# Patient Record
Sex: MALE | Race: WHITE | HISPANIC OR LATINO
[De-identification: names, ages, dates, MRNs, and addresses within clinical notes are randomized per-mention and may not be internally consistent; named-entity substitution may affect disease eponyms.]

---

## 2020-01-13 PROBLEM — Z00.00 ENCOUNTER FOR PREVENTIVE HEALTH EXAMINATION: Status: ACTIVE | Noted: 2020-01-13

## 2020-02-05 ENCOUNTER — APPOINTMENT (OUTPATIENT)
Dept: HEPATOLOGY | Facility: CLINIC | Age: 65
End: 2020-02-05
Payer: MEDICAID

## 2020-02-05 VITALS
HEART RATE: 80 BPM | DIASTOLIC BLOOD PRESSURE: 90 MMHG | HEIGHT: 67 IN | WEIGHT: 220 LBS | TEMPERATURE: 98.1 F | BODY MASS INDEX: 34.53 KG/M2 | SYSTOLIC BLOOD PRESSURE: 187 MMHG

## 2020-02-05 DIAGNOSIS — Z87.891 PERSONAL HISTORY OF NICOTINE DEPENDENCE: ICD-10-CM

## 2020-02-05 DIAGNOSIS — Z86.39 PERSONAL HISTORY OF OTHER ENDOCRINE, NUTRITIONAL AND METABOLIC DISEASE: ICD-10-CM

## 2020-02-05 PROCEDURE — 99203 OFFICE O/P NEW LOW 30 MIN: CPT

## 2020-02-05 RX ORDER — LISINOPRIL 10 MG/1
10 TABLET ORAL
Refills: 0 | Status: ACTIVE | COMMUNITY

## 2020-02-05 RX ORDER — METFORMIN HYDROCHLORIDE 625 MG/1
TABLET ORAL
Refills: 0 | Status: ACTIVE | COMMUNITY

## 2020-02-05 RX ORDER — AMLODIPINE BESYLATE 10 MG/1
10 TABLET ORAL
Refills: 0 | Status: ACTIVE | COMMUNITY

## 2020-02-06 LAB
AFP-TM SERPL-MCNC: 8781 NG/ML
ALBUMIN SERPL ELPH-MCNC: 4.8 G/DL
ALP BLD-CCNC: 71 U/L
ALT SERPL-CCNC: 62 U/L
ANION GAP SERPL CALC-SCNC: 14 MMOL/L
AST SERPL-CCNC: 41 U/L
BASOPHILS # BLD AUTO: 0.02 K/UL
BASOPHILS NFR BLD AUTO: 0.3 %
BILIRUB SERPL-MCNC: 0.3 MG/DL
BUN SERPL-MCNC: 22 MG/DL
CALCIUM SERPL-MCNC: 10.4 MG/DL
CERULOPLASMIN SERPL-MCNC: 27 MG/DL
CHLORIDE SERPL-SCNC: 101 MMOL/L
CO2 SERPL-SCNC: 24 MMOL/L
CREAT SERPL-MCNC: 1.24 MG/DL
EOSINOPHIL # BLD AUTO: 0.18 K/UL
EOSINOPHIL NFR BLD AUTO: 2.9 %
ESTIMATED AVERAGE GLUCOSE: 166 MG/DL
FERRITIN SERPL-MCNC: 74 NG/ML
GLUCOSE SERPL-MCNC: 161 MG/DL
HBA1C MFR BLD HPLC: 7.4 %
HBV CORE IGG+IGM SER QL: NONREACTIVE
HBV SURFACE AB SER QL: NONREACTIVE
HBV SURFACE AG SER QL: NONREACTIVE
HCT VFR BLD CALC: 47.9 %
HCV AB SER QL: NONREACTIVE
HCV S/CO RATIO: 0.19 S/CO
HGB BLD-MCNC: 15.7 G/DL
IMM GRANULOCYTES NFR BLD AUTO: 0.3 %
INR PPP: 0.99 RATIO
IRON SATN MFR SERPL: 33 %
IRON SERPL-MCNC: 108 UG/DL
LYMPHOCYTES # BLD AUTO: 1.22 K/UL
LYMPHOCYTES NFR BLD AUTO: 19.3 %
MAN DIFF?: NORMAL
MCHC RBC-ENTMCNC: 28.9 PG
MCHC RBC-ENTMCNC: 32.8 GM/DL
MCV RBC AUTO: 88.2 FL
MONOCYTES # BLD AUTO: 0.72 K/UL
MONOCYTES NFR BLD AUTO: 11.4 %
NEUTROPHILS # BLD AUTO: 4.15 K/UL
NEUTROPHILS NFR BLD AUTO: 65.8 %
PLATELET # BLD AUTO: 183 K/UL
POTASSIUM SERPL-SCNC: 4.8 MMOL/L
PROT SERPL-MCNC: 8.3 G/DL
PT BLD: 11.4 SEC
RBC # BLD: 5.43 M/UL
RBC # FLD: 12.5 %
SODIUM SERPL-SCNC: 139 MMOL/L
TIBC SERPL-MCNC: 325 UG/DL
UIBC SERPL-MCNC: 218 UG/DL
WBC # FLD AUTO: 6.31 K/UL

## 2020-02-07 LAB
DEPRECATED KAPPA LC FREE/LAMBDA SER: 1.15 RATIO
IGA SER QL IEP: 554 MG/DL
IGG SER QL IEP: 1803 MG/DL
IGM SER QL IEP: 132 MG/DL
KAPPA LC CSF-MCNC: 2.33 MG/DL
KAPPA LC SERPL-MCNC: 2.67 MG/DL
LKM AB SER QL IF: <20.1 UNITS
MITOCHONDRIA AB SER IF-ACNC: NORMAL
TTG IGA SER IA-ACNC: <1.2 U/ML
TTG IGA SER-ACNC: NEGATIVE

## 2020-02-09 LAB
ANA SER IF-ACNC: NEGATIVE
SMOOTH MUSCLE AB SER QL IF: NORMAL

## 2020-02-13 LAB — SOLUBLE LIVER IGG SER IA-ACNC: < 20.1 UNITS

## 2020-03-13 ENCOUNTER — APPOINTMENT (OUTPATIENT)
Dept: MRI IMAGING | Facility: IMAGING CENTER | Age: 65
End: 2020-03-13

## 2020-04-01 ENCOUNTER — OUTPATIENT (OUTPATIENT)
Dept: OUTPATIENT SERVICES | Facility: HOSPITAL | Age: 65
LOS: 1 days | End: 2020-04-01
Payer: MEDICAID

## 2020-04-01 PROCEDURE — G9001: CPT

## 2020-04-22 ENCOUNTER — APPOINTMENT (OUTPATIENT)
Dept: HEPATOLOGY | Facility: CLINIC | Age: 65
End: 2020-04-22

## 2020-05-01 ENCOUNTER — OUTPATIENT (OUTPATIENT)
Dept: OUTPATIENT SERVICES | Facility: HOSPITAL | Age: 65
LOS: 1 days | End: 2020-05-01

## 2020-05-06 DIAGNOSIS — Z71.89 OTHER SPECIFIED COUNSELING: ICD-10-CM

## 2020-05-26 DIAGNOSIS — Z71.89 OTHER SPECIFIED COUNSELING: ICD-10-CM

## 2020-05-30 ENCOUNTER — TRANSCRIPTION ENCOUNTER (OUTPATIENT)
Age: 65
End: 2020-05-30

## 2020-06-10 ENCOUNTER — INPATIENT (INPATIENT)
Facility: HOSPITAL | Age: 65
LOS: 1 days | Discharge: HOME | End: 2020-06-12
Attending: INTERNAL MEDICINE | Admitting: INTERNAL MEDICINE
Payer: MEDICAID

## 2020-06-10 ENCOUNTER — APPOINTMENT (OUTPATIENT)
Dept: MRI IMAGING | Facility: CLINIC | Age: 65
End: 2020-06-10

## 2020-06-10 VITALS
HEART RATE: 116 BPM | HEIGHT: 67 IN | OXYGEN SATURATION: 95 % | TEMPERATURE: 98 F | DIASTOLIC BLOOD PRESSURE: 96 MMHG | SYSTOLIC BLOOD PRESSURE: 182 MMHG | WEIGHT: 220.02 LBS

## 2020-06-10 DIAGNOSIS — I10 ESSENTIAL (PRIMARY) HYPERTENSION: ICD-10-CM

## 2020-06-10 DIAGNOSIS — E11.9 TYPE 2 DIABETES MELLITUS WITHOUT COMPLICATIONS: ICD-10-CM

## 2020-06-10 DIAGNOSIS — C22.9 MALIGNANT NEOPLASM OF LIVER, NOT SPECIFIED AS PRIMARY OR SECONDARY: ICD-10-CM

## 2020-06-10 DIAGNOSIS — I81 PORTAL VEIN THROMBOSIS: ICD-10-CM

## 2020-06-10 DIAGNOSIS — K76.6 PORTAL HYPERTENSION: ICD-10-CM

## 2020-06-10 LAB
ALBUMIN SERPL ELPH-MCNC: 2.8 G/DL — LOW (ref 3.3–5)
ALP SERPL-CCNC: 236 U/L — HIGH (ref 40–120)
ALT FLD-CCNC: 116 U/L — HIGH (ref 12–78)
AMYLASE P1 CFR SERPL: 82 U/L — SIGNIFICANT CHANGE UP (ref 25–115)
ANION GAP SERPL CALC-SCNC: 12 MMOL/L — SIGNIFICANT CHANGE UP (ref 5–17)
APPEARANCE UR: CLEAR — SIGNIFICANT CHANGE UP
APTT BLD: 189.2 SEC — CRITICAL HIGH (ref 27.5–36.3)
APTT BLD: 63.4 SEC — HIGH (ref 27.5–36.3)
APTT BLD: 71.4 SEC — HIGH (ref 27.5–36.3)
AST SERPL-CCNC: 318 U/L — HIGH (ref 15–37)
BASOPHILS # BLD AUTO: 0.05 K/UL — SIGNIFICANT CHANGE UP (ref 0–0.2)
BASOPHILS NFR BLD AUTO: 0.7 % — SIGNIFICANT CHANGE UP (ref 0–2)
BILIRUB DIRECT SERPL-MCNC: 2.37 MG/DL — HIGH (ref 0.05–0.2)
BILIRUB INDIRECT FLD-MCNC: 0.9 MG/DL — SIGNIFICANT CHANGE UP (ref 0.2–1)
BILIRUB SERPL-MCNC: 3.3 MG/DL — HIGH (ref 0.2–1.2)
BILIRUB UR-MCNC: ABNORMAL
BUN SERPL-MCNC: 17 MG/DL — SIGNIFICANT CHANGE UP (ref 7–23)
CALCIUM SERPL-MCNC: 9.5 MG/DL — SIGNIFICANT CHANGE UP (ref 8.5–10.1)
CHLORIDE SERPL-SCNC: 98 MMOL/L — SIGNIFICANT CHANGE UP (ref 96–108)
CO2 SERPL-SCNC: 24 MMOL/L — SIGNIFICANT CHANGE UP (ref 22–31)
COLOR SPEC: YELLOW — SIGNIFICANT CHANGE UP
CREAT SERPL-MCNC: 1.09 MG/DL — SIGNIFICANT CHANGE UP (ref 0.5–1.3)
DIFF PNL FLD: ABNORMAL
EOSINOPHIL # BLD AUTO: 0.09 K/UL — SIGNIFICANT CHANGE UP (ref 0–0.5)
EOSINOPHIL NFR BLD AUTO: 1.2 % — SIGNIFICANT CHANGE UP (ref 0–6)
EPI CELLS # UR: SIGNIFICANT CHANGE UP
GLUCOSE BLDC GLUCOMTR-MCNC: 126 MG/DL — HIGH (ref 70–99)
GLUCOSE BLDC GLUCOMTR-MCNC: 135 MG/DL — HIGH (ref 70–99)
GLUCOSE BLDC GLUCOMTR-MCNC: 164 MG/DL — HIGH (ref 70–99)
GLUCOSE SERPL-MCNC: 156 MG/DL — HIGH (ref 70–99)
GLUCOSE UR QL: 1000 MG/DL
HAV IGM SER-ACNC: SIGNIFICANT CHANGE UP
HBV CORE IGM SER-ACNC: SIGNIFICANT CHANGE UP
HBV SURFACE AG SER-ACNC: SIGNIFICANT CHANGE UP
HCT VFR BLD CALC: 40.5 % — SIGNIFICANT CHANGE UP (ref 39–50)
HCT VFR BLD CALC: 41.4 % — SIGNIFICANT CHANGE UP (ref 39–50)
HCV AB S/CO SERPL IA: 0.16 S/CO — SIGNIFICANT CHANGE UP (ref 0–0.99)
HCV AB SERPL-IMP: SIGNIFICANT CHANGE UP
HGB BLD-MCNC: 12.6 G/DL — LOW (ref 13–17)
HGB BLD-MCNC: 13.1 G/DL — SIGNIFICANT CHANGE UP (ref 13–17)
IMM GRANULOCYTES NFR BLD AUTO: 0.4 % — SIGNIFICANT CHANGE UP (ref 0–1.5)
INR BLD: 1.17 RATIO — HIGH (ref 0.88–1.16)
KETONES UR-MCNC: ABNORMAL
LEUKOCYTE ESTERASE UR-ACNC: NEGATIVE — SIGNIFICANT CHANGE UP
LIDOCAIN IGE QN: 344 U/L — SIGNIFICANT CHANGE UP (ref 73–393)
LYMPHOCYTES # BLD AUTO: 0.82 K/UL — LOW (ref 1–3.3)
LYMPHOCYTES # BLD AUTO: 10.9 % — LOW (ref 13–44)
MCHC RBC-ENTMCNC: 22.3 PG — LOW (ref 27–34)
MCHC RBC-ENTMCNC: 22.3 PG — LOW (ref 27–34)
MCHC RBC-ENTMCNC: 31.1 GM/DL — LOW (ref 32–36)
MCHC RBC-ENTMCNC: 31.6 GM/DL — LOW (ref 32–36)
MCV RBC AUTO: 70.4 FL — LOW (ref 80–100)
MCV RBC AUTO: 71.8 FL — LOW (ref 80–100)
MONOCYTES # BLD AUTO: 0.74 K/UL — SIGNIFICANT CHANGE UP (ref 0–0.9)
MONOCYTES NFR BLD AUTO: 9.8 % — SIGNIFICANT CHANGE UP (ref 2–14)
NEUTROPHILS # BLD AUTO: 5.8 K/UL — SIGNIFICANT CHANGE UP (ref 1.8–7.4)
NEUTROPHILS NFR BLD AUTO: 77 % — SIGNIFICANT CHANGE UP (ref 43–77)
NITRITE UR-MCNC: NEGATIVE — SIGNIFICANT CHANGE UP
NRBC # BLD: 0 /100 WBCS — SIGNIFICANT CHANGE UP (ref 0–0)
NRBC # BLD: 0 /100 WBCS — SIGNIFICANT CHANGE UP (ref 0–0)
PH UR: 5 — SIGNIFICANT CHANGE UP (ref 5–8)
PLATELET # BLD AUTO: 324 K/UL — SIGNIFICANT CHANGE UP (ref 150–400)
PLATELET # BLD AUTO: 324 K/UL — SIGNIFICANT CHANGE UP (ref 150–400)
POTASSIUM SERPL-MCNC: 4.3 MMOL/L — SIGNIFICANT CHANGE UP (ref 3.5–5.3)
POTASSIUM SERPL-SCNC: 4.3 MMOL/L — SIGNIFICANT CHANGE UP (ref 3.5–5.3)
PROT SERPL-MCNC: 8.7 GM/DL — HIGH (ref 6–8.3)
PROT UR-MCNC: 30 MG/DL
PROTHROM AB SERPL-ACNC: 13 SEC — HIGH (ref 10–12.9)
RBC # BLD: 5.64 M/UL — SIGNIFICANT CHANGE UP (ref 4.2–5.8)
RBC # BLD: 5.88 M/UL — HIGH (ref 4.2–5.8)
RBC # FLD: 19.2 % — HIGH (ref 10.3–14.5)
RBC # FLD: 19.2 % — HIGH (ref 10.3–14.5)
RBC CASTS # UR COMP ASSIST: SIGNIFICANT CHANGE UP /HPF (ref 0–4)
SARS-COV-2 RNA SPEC QL NAA+PROBE: SIGNIFICANT CHANGE UP
SODIUM SERPL-SCNC: 134 MMOL/L — LOW (ref 135–145)
SP GR SPEC: 1.01 — SIGNIFICANT CHANGE UP (ref 1.01–1.02)
UROBILINOGEN FLD QL: 1 MG/DL
WBC # BLD: 6.97 K/UL — SIGNIFICANT CHANGE UP (ref 3.8–10.5)
WBC # BLD: 7.53 K/UL — SIGNIFICANT CHANGE UP (ref 3.8–10.5)
WBC # FLD AUTO: 6.97 K/UL — SIGNIFICANT CHANGE UP (ref 3.8–10.5)
WBC # FLD AUTO: 7.53 K/UL — SIGNIFICANT CHANGE UP (ref 3.8–10.5)
WBC UR QL: SIGNIFICANT CHANGE UP

## 2020-06-10 PROCEDURE — 74177 CT ABD & PELVIS W/CONTRAST: CPT | Mod: 26

## 2020-06-10 PROCEDURE — 71045 X-RAY EXAM CHEST 1 VIEW: CPT | Mod: 26

## 2020-06-10 PROCEDURE — 99222 1ST HOSP IP/OBS MODERATE 55: CPT

## 2020-06-10 PROCEDURE — 99285 EMERGENCY DEPT VISIT HI MDM: CPT

## 2020-06-10 PROCEDURE — 76700 US EXAM ABDOM COMPLETE: CPT | Mod: 26

## 2020-06-10 PROCEDURE — 93010 ELECTROCARDIOGRAM REPORT: CPT

## 2020-06-10 PROCEDURE — 12345: CPT | Mod: NC

## 2020-06-10 RX ORDER — DEXTROSE 50 % IN WATER 50 %
25 SYRINGE (ML) INTRAVENOUS ONCE
Refills: 0 | Status: DISCONTINUED | OUTPATIENT
Start: 2020-06-10 | End: 2020-06-12

## 2020-06-10 RX ORDER — AMLODIPINE BESYLATE 2.5 MG/1
10 TABLET ORAL DAILY
Refills: 0 | Status: DISCONTINUED | OUTPATIENT
Start: 2020-06-10 | End: 2020-06-10

## 2020-06-10 RX ORDER — LISINOPRIL 2.5 MG/1
10 TABLET ORAL DAILY
Refills: 0 | Status: DISCONTINUED | OUTPATIENT
Start: 2020-06-10 | End: 2020-06-10

## 2020-06-10 RX ORDER — AMLODIPINE BESYLATE 2.5 MG/1
10 TABLET ORAL DAILY
Refills: 0 | Status: DISCONTINUED | OUTPATIENT
Start: 2020-06-10 | End: 2020-06-11

## 2020-06-10 RX ORDER — HEPARIN SODIUM 5000 [USP'U]/ML
8000 INJECTION INTRAVENOUS; SUBCUTANEOUS ONCE
Refills: 0 | Status: COMPLETED | OUTPATIENT
Start: 2020-06-10 | End: 2020-06-10

## 2020-06-10 RX ORDER — SODIUM CHLORIDE 9 MG/ML
1000 INJECTION, SOLUTION INTRAVENOUS
Refills: 0 | Status: DISCONTINUED | OUTPATIENT
Start: 2020-06-10 | End: 2020-06-12

## 2020-06-10 RX ORDER — HEPARIN SODIUM 5000 [USP'U]/ML
INJECTION INTRAVENOUS; SUBCUTANEOUS
Qty: 25000 | Refills: 0 | Status: DISCONTINUED | OUTPATIENT
Start: 2020-06-10 | End: 2020-06-11

## 2020-06-10 RX ORDER — HEPARIN SODIUM 5000 [USP'U]/ML
8000 INJECTION INTRAVENOUS; SUBCUTANEOUS EVERY 6 HOURS
Refills: 0 | Status: DISCONTINUED | OUTPATIENT
Start: 2020-06-10 | End: 2020-06-11

## 2020-06-10 RX ORDER — LISINOPRIL 2.5 MG/1
10 TABLET ORAL DAILY
Refills: 0 | Status: DISCONTINUED | OUTPATIENT
Start: 2020-06-10 | End: 2020-06-12

## 2020-06-10 RX ORDER — GLUCAGON INJECTION, SOLUTION 0.5 MG/.1ML
1 INJECTION, SOLUTION SUBCUTANEOUS ONCE
Refills: 0 | Status: DISCONTINUED | OUTPATIENT
Start: 2020-06-10 | End: 2020-06-12

## 2020-06-10 RX ORDER — HEPARIN SODIUM 5000 [USP'U]/ML
4000 INJECTION INTRAVENOUS; SUBCUTANEOUS EVERY 6 HOURS
Refills: 0 | Status: DISCONTINUED | OUTPATIENT
Start: 2020-06-10 | End: 2020-06-11

## 2020-06-10 RX ORDER — SODIUM CHLORIDE 9 MG/ML
1000 INJECTION, SOLUTION INTRAVENOUS
Refills: 0 | Status: COMPLETED | OUTPATIENT
Start: 2020-06-10 | End: 2020-06-10

## 2020-06-10 RX ORDER — DEXTROSE 50 % IN WATER 50 %
12.5 SYRINGE (ML) INTRAVENOUS ONCE
Refills: 0 | Status: DISCONTINUED | OUTPATIENT
Start: 2020-06-10 | End: 2020-06-12

## 2020-06-10 RX ORDER — DEXTROSE 50 % IN WATER 50 %
15 SYRINGE (ML) INTRAVENOUS ONCE
Refills: 0 | Status: DISCONTINUED | OUTPATIENT
Start: 2020-06-10 | End: 2020-06-12

## 2020-06-10 RX ORDER — INSULIN LISPRO 100/ML
VIAL (ML) SUBCUTANEOUS
Refills: 0 | Status: DISCONTINUED | OUTPATIENT
Start: 2020-06-10 | End: 2020-06-12

## 2020-06-10 RX ADMIN — Medication 1: at 11:58

## 2020-06-10 RX ADMIN — HEPARIN SODIUM 5000 UNIT(S): 5000 INJECTION INTRAVENOUS; SUBCUTANEOUS at 07:02

## 2020-06-10 RX ADMIN — SODIUM CHLORIDE 70 MILLILITER(S): 9 INJECTION, SOLUTION INTRAVENOUS at 07:06

## 2020-06-10 RX ADMIN — AMLODIPINE BESYLATE 10 MILLIGRAM(S): 2.5 TABLET ORAL at 08:16

## 2020-06-10 RX ADMIN — HEPARIN SODIUM 1800 UNIT(S)/HR: 5000 INJECTION INTRAVENOUS; SUBCUTANEOUS at 15:30

## 2020-06-10 RX ADMIN — HEPARIN SODIUM 1800 UNIT(S)/HR: 5000 INJECTION INTRAVENOUS; SUBCUTANEOUS at 21:10

## 2020-06-10 RX ADMIN — LISINOPRIL 10 MILLIGRAM(S): 2.5 TABLET ORAL at 09:37

## 2020-06-10 RX ADMIN — HEPARIN SODIUM 1800 UNIT(S)/HR: 5000 INJECTION INTRAVENOUS; SUBCUTANEOUS at 07:03

## 2020-06-10 NOTE — H&P ADULT - NSHPPHYSICALEXAM_GEN_ALL_CORE
Physical exam:  General: patient in no acute distress, resting comfortably  Head:  Atraumatic, Normocephalic  Eyes: EOMI, PERRLA, clear sclera  Neck: Supple, thyroid nontender, non enlarged  Cardio: S1/S2 +ve, regular rate and rhythm, no M/G/R  Resp: clear to ausculation bilaterally, no rales or wheezes  GI: abdomen soft, nontender to soft and deep palpation in all quadrants, non distended, no guarding, BS +ve x 4  Ext: no significant pedal edema  Neuro: CN 2-12 intact, no significant motor or sensory deficits.  Skin: No rashes or lesions

## 2020-06-10 NOTE — CHART NOTE - NSCHARTNOTEFT_GEN_A_CORE
Patient seen and examined bedside.     Vital Signs Last 24 Hrs  T(C): 36.9 (10 Guru 2020 16:45), Max: 37.1 (10 Guru 2020 07:40)  T(F): 98.5 (10 Guru 2020 16:45), Max: 98.8 (10 Guru 2020 07:40)  HR: 90 (10 Guru 2020 16:45) (86 - 116)  BP: 169/82 (10 Guru 2020 16:45) (146/81 - 182/96)  BP(mean): --  RR: 18 (10 Guru 2020 16:45) (17 - 19)  SpO2: 96% (10 Guru 2020 16:45) (95% - 98%)    GENERAL: NAD well-developed  HEAD:  Atraumatic, Normocephalic  EYES: EOMI, PERRLA, conjunctiva and sclera clear  ENMT: No tonsillar erythema, exudates, or enlargement; Moist mucous membranes, Good dentition, No lesions  NECK: Supple, No JVD, Normal thyroid  NERVOUS SYSTEM:  Alert & Oriented X3, Good concentration; Motor Strength 5/5 B/L upper and lower extremities; DTRs 2+ intact and symmetric  CHEST/LUNG: Clear to percussion bilaterally; No rales, rhonchi, wheezing, or rubs  HEART: Regular rate and rhythm; No murmurs, rubs, or gallops  ABDOMEN: Soft, Nontender, Nondistended; Bowel sounds present  EXTREMITIES:  2+ Peripheral Pulses, No clubbing, cyanosis, or edema  LYMPH: No lymphadenopathy   SKIN: No rashes or lesions    labs and imaging :                        12.6   6.97  )-----------( 324      ( 10 Guru 2020 14:41 )             40.5   06-10    134<L>  |  98  |  17  ----------------------------<  156<H>  4.3   |  24  |  1.09    Ca    9.5      10 Guru 2020 03:12    TPro  8.7<H>  /  Alb  2.8<L>  /  TBili  3.3<H>  /  DBili  2.37<H>  /  AST  318<H>  /  ALT  116<H>  /  AlkPhos  236<H>  06-10    < from: CT Abdomen and Pelvis w/ IV Cont (06.10.20 @ 05:45) >      TECHNIQUE: CT examination of the abdomen and pelvis was performed following the intravenous administration of 90 mL Optiray 350. 35 mL Optiray 350 discarded.  CT dose lowering techniques were used, to include: automated exposure control, adjustment for patient size, and/or use of iterative reconstruction.    FINDINGS:    ABDOMEN/PELVIS:    Lower Chest: Clear    Liver: Severe hepatomegaly. Areas of nodular hypoenhancement. Nodularity ofthe hepatic capsule.    Gallbladder/Billary: Decompressed    Pancreas: Within normal limits    Spleen: Mildly enlarged    Adrenal Glands: Within normal limits    Kidneys: 2.9 cm complex cystic mass arising from the upper pole right kidney, with areasof peripheral calcification and central density measurement higher than simple fluid.    GI Tract: Diverticulosis coli. Normal appendix. No bowel obstruction.    Mesentery/Peritoneum: Mild perihepatic ascites. No free air.    Vasculature: Thrombosis of the right portal vein appears acute. Surgical clips in the adjacent rahul hepatis. Main portal vein is enlarged. Superior mesenteric vein appears patent. Atherosclerotic change of the abdominal aorta without aneurysm.    Lymph Nodes: No enlarged lymph node measuring greater than 10 mm in short axis.    Abdominal Wall: Intact    Bladder: Within normal limits    Reproductive: Prostate is normal in caliber.    Musculoskeletal: No acute osseous abnormality.      IMPRESSION:    1.  Acute thrombosis of the right portal vein. Critical results discussed with Dr. Bolden at 6 AM.   2.  Hepatic cirrhosis and stigmata portal venous hypertension.  3.  Indeterminate cystic mass arising from the upper pole right kidney, nonemergent dedicated sonographic evaluation is recommended.    < end of copied text >    < from: US Abdomen Complete (06.10.20 @ 04:44) >      FINDINGS:     LIVER: Mild hepatomegaly. Nonspecific hepatic heterogeneity in echogenicity and echotexture, limiting evaluation for focal lesions. No intrahepatic biliary ductal dilatation.   GALLBLADDER:Contracted. No gallstones, wall thickening, or pericholecystic fluid. No sonographic Maxwell's sign.   CBD: Normal caliber, 6 mm.   PANCREAS: Evaluation of the pancreas is limited secondary to overlying bowel gas.   RIGHT KIDNEY: 10.1 cm in length. Nohydronephrosis.   LEFT KIDNEY: 11.4 cm in length. No hydronephrosis.   SPLEEN: Mild splenomegaly, measuring 16.3 cm without evidence of focal mass.   AORTA AND IVC: Visualized portions of the IVC and aorta are unremarkable.  MISCELLANEOUS: Trace ascites.     IMPRESSION:  Mild hepatosplenomegaly. Nonspecific hepatic heterogeneity in echogenicity and echotexture, limiting evaluation for focal lesions. Trace ascites.     < end of copied text >    Assessment and Plan:   Portal vein thrombosis on heparin drip   Liver cirrhosis with liver cancer?  HTN  DM2    GI and Onc consult  follow AFP  continue meds   follow A1c     the rest of management per H&P        EKG: NSR, LVH Patient seen and examined bedside.     Vital Signs Last 24 Hrs  T(C): 36.9 (10 Guru 2020 16:45), Max: 37.1 (10 Guru 2020 07:40)  T(F): 98.5 (10 Guru 2020 16:45), Max: 98.8 (10 Guru 2020 07:40)  HR: 90 (10 Guru 2020 16:45) (86 - 116)  BP: 169/82 (10 Guru 2020 16:45) (146/81 - 182/96)  BP(mean): --  RR: 18 (10 Guru 2020 16:45) (17 - 19)  SpO2: 96% (10 Guru 2020 16:45) (95% - 98%) on RA     GENERAL: NAD well-developed, no increased WOB   HEAD:  Atraumatic, Normocephalic  EYES: EOMI, PERRLA, conjunctiva and sclera clear  ENMT: No tonsillar erythema, exudates, or enlargement; Moist mucous membranes  NECK: Supple, No JVD, Normal thyroid  NERVOUS SYSTEM:  Alert & Oriented X3, Good concentration; grossly nonfocal  CHEST/LUNG: CTAB  HEART: Regular rate and rhythm; No murmurs, rubs, or gallops  ABDOMEN: Soft, RUQ mildly tender to palpation, Nondistended; Bowel sounds present  EXTREMITIES:  2+ Peripheral Pulses b/l, No clubbing, cyanosis, or edema b/l       labs and imaging :                        12.6   6.97  )-----------( 324      ( 10 Guru 2020 14:41 )             40.5   06-10    134<L>  |  98  |  17  ----------------------------<  156<H>  4.3   |  24  |  1.09    Ca    9.5      10 Guru 2020 03:12    TPro  8.7<H>  /  Alb  2.8<L>  /  TBili  3.3<H>  /  DBili  2.37<H>  /  AST  318<H>  /  ALT  116<H>  /  AlkPhos  236<H>  06-10    < from: CT Abdomen and Pelvis w/ IV Cont (06.10.20 @ 05:45) >      TECHNIQUE: CT examination of the abdomen and pelvis was performed following the intravenous administration of 90 mL Optiray 350. 35 mL Optiray 350 discarded.  CT dose lowering techniques were used, to include: automated exposure control, adjustment for patient size, and/or use of iterative reconstruction.    FINDINGS:    ABDOMEN/PELVIS:    Lower Chest: Clear    Liver: Severe hepatomegaly. Areas of nodular hypoenhancement. Nodularity ofthe hepatic capsule.    Gallbladder/Billary: Decompressed    Pancreas: Within normal limits    Spleen: Mildly enlarged    Adrenal Glands: Within normal limits    Kidneys: 2.9 cm complex cystic mass arising from the upper pole right kidney, with areasof peripheral calcification and central density measurement higher than simple fluid.    GI Tract: Diverticulosis coli. Normal appendix. No bowel obstruction.    Mesentery/Peritoneum: Mild perihepatic ascites. No free air.    Vasculature: Thrombosis of the right portal vein appears acute. Surgical clips in the adjacent rahul hepatis. Main portal vein is enlarged. Superior mesenteric vein appears patent. Atherosclerotic change of the abdominal aorta without aneurysm.    Lymph Nodes: No enlarged lymph node measuring greater than 10 mm in short axis.    Abdominal Wall: Intact    Bladder: Within normal limits    Reproductive: Prostate is normal in caliber.    Musculoskeletal: No acute osseous abnormality.      IMPRESSION:    1.  Acute thrombosis of the right portal vein. Critical results discussed with Dr. Bolden at 6 AM.   2.  Hepatic cirrhosis and stigmata portal venous hypertension.  3.  Indeterminate cystic mass arising from the upper pole right kidney, nonemergent dedicated sonographic evaluation is recommended.    < end of copied text >    < from: US Abdomen Complete (06.10.20 @ 04:44) >      FINDINGS:     LIVER: Mild hepatomegaly. Nonspecific hepatic heterogeneity in echogenicity and echotexture, limiting evaluation for focal lesions. No intrahepatic biliary ductal dilatation.   GALLBLADDER:Contracted. No gallstones, wall thickening, or pericholecystic fluid. No sonographic Maxwell's sign.   CBD: Normal caliber, 6 mm.   PANCREAS: Evaluation of the pancreas is limited secondary to overlying bowel gas.   RIGHT KIDNEY: 10.1 cm in length. Nohydronephrosis.   LEFT KIDNEY: 11.4 cm in length. No hydronephrosis.   SPLEEN: Mild splenomegaly, measuring 16.3 cm without evidence of focal mass.   AORTA AND IVC: Visualized portions of the IVC and aorta are unremarkable.  MISCELLANEOUS: Trace ascites.     IMPRESSION:  Mild hepatosplenomegaly. Nonspecific hepatic heterogeneity in echogenicity and echotexture, limiting evaluation for focal lesions. Trace ascites.     < end of copied text >    Assessment and Plan:   Portal vein thrombosis on heparin drip   Liver cirrhosis with liver cancer?  HTN  DM2    GI and Onc consult  follow AFP  continue meds   follow A1c     the rest of management per H&P        EKG: NSR, LVH

## 2020-06-10 NOTE — H&P ADULT - HISTORY OF PRESENT ILLNESS
Patient is a 64M with a PMH of HTN, DM, and Liver Ca? who presents to the ED for abdominal pain.  Patient reports that he has had sporadic episodes of lower R sided abdominal pain for the past week.  8/10 in severity with some radiation to the back.  Reports the pain is worse when he takes deep breaths.  Patient endores associated reduced PO intake due to pain and denies hx of constipation, diarrhea, fever, chills, nausea or vomiting.  Patient states that his urine has appeared more yellow than normal and he has noticed this discoloration for the past 2-3 weeks.  Patient states that he was diagnosed with liver cancer about 2 years ago and had started treatment (possibly chemo?) at [facility] however he was not able to complete his treatment as he had to move from [place] to [place] this year.  Denies history of hepatitis.  Unsure about medical history in general.  Has been following with a hepatologist - scheduled for MRI abdomen today.  Mild tachycardia in ED.  Labs show decreased albumin, increased alk phos, bilirubin and transaminitis.  US abdomen benign.  CT abdomen shows portal vein thrombosis - started on heparin drip.  Will admit to floor.        Eddi Calles - hepatologist

## 2020-06-10 NOTE — CONSULT NOTE ADULT - SUBJECTIVE AND OBJECTIVE BOX
Reason for Consultation: ? HCC    HPI: Patient is a 64y Male seen on consultatioin for the evaluation and management of ? HCC    Patient is a 64M with a PMH of HTN, DM, and Liver Ca? who presents to the ED for abdominal pain.  Patient reports that he has had sporadic episodes of lower R sided abdominal pain for the past week.  8/10 in severity with some radiation to the back.  Reports the pain is worse when he takes deep breaths.  Patient endores associated reduced PO intake due to pain and denies hx of constipation, diarrhea, fever, chills, nausea or vomiting.  Patient states that his urine has appeared more yellow than normal and he has noticed this discoloration for the past 2-3 weeks.  Patient states that he was diagnosed with liver cancer about 2 years ago and had started treatment (possibly chemo?) at [facility] however he was not able to complete his treatment as he had to move from [place] to [place] this year.  Denies history of hepatitis.  Unsure about medical history in general.  Has been following with a hepatologist - scheduled for MRI abdomen today.  Mild tachycardia in ED.  Labs show decreased albumin, increased alk phos, bilirubin and transaminitis.  US abdomen benign.  CT abdomen shows portal vein thrombosis - started on heparin drip.  Will admit to floor.            PAST MEDICAL & SURGICAL HISTORY:  Hypertension  No significant past surgical history      MEDICATIONS  (STANDING):  amLODIPine   Tablet 10 milliGRAM(s) Oral daily  dextrose 5%. 1000 milliLiter(s) (50 mL/Hr) IV Continuous <Continuous>  dextrose 50% Injectable 12.5 Gram(s) IV Push once  dextrose 50% Injectable 25 Gram(s) IV Push once  dextrose 50% Injectable 25 Gram(s) IV Push once  heparin  Infusion.  Unit(s)/Hr (18 mL/Hr) IV Continuous <Continuous>  insulin lispro (HumaLOG) corrective regimen sliding scale   SubCutaneous three times a day before meals  lisinopril 10 milliGRAM(s) Oral daily    MEDICATIONS  (PRN):  dextrose 40% Gel 15 Gram(s) Oral once PRN Blood Glucose LESS THAN 70 milliGRAM(s)/deciliter  glucagon  Injectable 1 milliGRAM(s) IntraMuscular once PRN Glucose LESS THAN 70 milligrams/deciliter  heparin   Injectable 8000 Unit(s) IV Push every 6 hours PRN For aPTT less than 40  heparin   Injectable 4000 Unit(s) IV Push every 6 hours PRN For aPTT between 40 - 57      Allergies    No Known Allergies    Intolerances        SOCIAL HISTORY:    Smoking Status: no  Alcohol: no  Occupation: yes    FAMILY HISTORY:        Vital Signs Last 24 Hrs  T(C): 37.1 (10 Guru 2020 07:40), Max: 37.1 (10 Guru 2020 07:40)  T(F): 98.8 (10 Guru 2020 07:40), Max: 98.8 (10 Guru 2020 07:40)  HR: 90 (10 Guru 2020 09:35) (90 - 116)  BP: 159/77 (10 Guru 2020 09:35) (159/77 - 182/96)  BP(mean): --  RR: 18 (10 Guru 2020 07:40) (18 - 18)  SpO2: 96% (10 Guru 2020 07:40) (95% - 96%)    PHYSICAL EXAM:    general - AAO x 3  HEENT - No Icterus  CVS - RRR  RS - AE B/L  Abd - soft, NT  Ext - Pulses +        LABS:                        13.1   7.53  )-----------( 324      ( 10 Guru 2020 03:12 )             41.4     06-10    134<L>  |  98  |  17  ----------------------------<  156<H>  4.3   |  24  |  1.09    Ca    9.5      10 Guru 2020 03:12    TPro  8.7<H>  /  Alb  2.8<L>  /  TBili  3.3<H>  /  DBili  2.37<H>  /  AST  318<H>  /  ALT  116<H>  /  AlkPhos  236<H>  06-10    PT/INR - ( 10 Guru 2020 09:53 )   PT: 13.0 sec;   INR: 1.17 ratio         PTT - ( 10 Guru 2020 09:53 )  PTT:189.2 sec  Urinalysis Basic - ( 10 Guru 2020 03:33 )    Color: Yellow / Appearance: Clear / S.010 / pH: x  Gluc: x / Ketone: Small  / Bili: Small / Urobili: 1 mg/dL   Blood: x / Protein: 30 mg/dL / Nitrite: Negative   Leuk Esterase: Negative / RBC: 0-2 /HPF / WBC 0-2   Sq Epi: x / Non Sq Epi: Occasional / Bacteria: x          RADIOLOGY & ADDITIONAL STUDIES:

## 2020-06-10 NOTE — ED PROVIDER NOTE - SKIN, MLM
Skin normal color for race, warm, dry and intact, +left mid arm redness volar aspect, diffuse anasarca, +left more swollen, pitting edema

## 2020-06-10 NOTE — ED ADULT TRIAGE NOTE - CHIEF COMPLAINT QUOTE
RUQ pain radiating to R- flank since last night. denies N/V, states, "my urine is very yellow" denies dysuria, burning on urination. scheduled for MRI upper abdomen tomorrow Hx HTN

## 2020-06-10 NOTE — H&P ADULT - ASSESSMENT
Patient is a 64M with a PMH of HTN, DM, and Liver Ca? who presents to the ED for abdominal pain.  Patient reports that he has had sporadic episodes of lower R sided abdominal pain for the past week.  8/10 in severity with some radiation to the back.  Reports the pain is worse when he takes deep breaths.  Patient endores associated reduced PO intake due to pain and denies hx of constipation, diarrhea, fever, chills, nausea or vomiting.  Patient states that his urine has appeared more yellow than normal and he has noticed this discoloration for the past 2-3 weeks.  Patient states that he was diagnosed with liver cancer about 2 years ago and had started treatment (possibly chemo?) at [facility] however he was not able to complete his treatment as he had to move from [place] to [place] this year.  Denies history of hepatitis.  Unsure about medical history in general.  Has been following with a hepatologist - scheduled for MRI abdomen today.  Mild tachycardia in ED.  Labs show decreased albumin, increased alk phos, bilirubin and transaminitis.  US abdomen benign.  CT abdomen shows portal vein thrombosis - started on heparin drip.  Will admit to floor.        Eddi Calles - hepatologist

## 2020-06-10 NOTE — CONSULT NOTE ADULT - SUBJECTIVE AND OBJECTIVE BOX
HPI:  Patient is a 64M with a PMH of HTN, DM, and Liver Ca? who presents to the ED for abdominal pain.  Patient reports that he has had sporadic episodes of lower R sided abdominal pain for the past week.  8/10 in severity with some radiation to the back.  Reports the pain is worse when he takes deep breaths.  Patient endores associated reduced PO intake due to pain and denies hx of constipation, diarrhea, fever, chills, nausea or vomiting.  Patient states that his urine has appeared more yellow than normal and he has noticed this discoloration for the past 2-3 weeks.  Patient states that he was diagnosed with liver cancer about 2 years ago and had started treatment (possibly chemo?) at [facility] however he was not able to complete his treatment as he had to move from [place] to [place] this year.  Denies history of hepatitis.  Unsure about medical history in general.  Has been following with a hepatologist - scheduled for MRI abdomen today.  Mild tachycardia in ED.  Labs show decreased albumin, increased alk phos, bilirubin and transaminitis.  US abdomen benign.  CT abdomen shows portal vein thrombosis - started on heparin drip.  Will admit to floor.        Eddi Calles - hepatologist (10 Guru 2020 06:58)  ------------------------------------------- As Above -----------------------------------  The patient presents with 7 days of vague abdominal pain. Not associated with N/V. Slowly worsening. Denies trauma. The patient denies melena, hematochezia, hematemesis, constipation, diarrhea, or change in bowel movements   Patient states that he has a history of liver cancer 2 years ago status post chemotherapy ( directly applied to liver )   Denies ETOH abuse and stopped drinking a while ago. CT scan reveals cirrhosis and PVT  Patient denies a history of Cirrhosis  Last colonoscopy was many years ago.   Patient being followed by Dr Calles ( Hepatology [phone] ) ( Was seen once )   CT scan c/w Cirrhosis and PVT - was started on heparin in the ER       PAST MEDICAL & SURGICAL HISTORY:  Hypertension  No significant past surgical history      MEDICATIONS  (STANDING):  amLODIPine   Tablet 10 milliGRAM(s) Oral daily  dextrose 5%. 1000 milliLiter(s) (50 mL/Hr) IV Continuous <Continuous>  dextrose 50% Injectable 12.5 Gram(s) IV Push once  dextrose 50% Injectable 25 Gram(s) IV Push once  dextrose 50% Injectable 25 Gram(s) IV Push once  heparin  Infusion.  Unit(s)/Hr (18 mL/Hr) IV Continuous <Continuous>  insulin lispro (HumaLOG) corrective regimen sliding scale   SubCutaneous three times a day before meals  lisinopril 10 milliGRAM(s) Oral daily    MEDICATIONS  (PRN):  dextrose 40% Gel 15 Gram(s) Oral once PRN Blood Glucose LESS THAN 70 milliGRAM(s)/deciliter  glucagon  Injectable 1 milliGRAM(s) IntraMuscular once PRN Glucose LESS THAN 70 milligrams/deciliter  heparin   Injectable 8000 Unit(s) IV Push every 6 hours PRN For aPTT less than 40  heparin   Injectable 4000 Unit(s) IV Push every 6 hours PRN For aPTT between 40 - 57      Allergies    No Known Allergies    Intolerances        FAMILY HISTORY:      REVIEW OF SYSTEMS:    CONSTITUTIONAL: No fever, weight loss,   EYES: No eye pain, visual disturbances, or discharge  ENMT:  No difficulty hearing, tinnitus, vertigo; No sinus or throat pain  NECK: No pain or stiffness  BREASTS: No pain, masses, or nipple discharge  RESPIRATORY: No cough, wheezing, chills or hemoptysis; No shortness of breath  CARDIOVASCULAR: No chest pain, palpitations, dizziness, or leg swelling  GASTROINTESTINAL: See above   GENITOURINARY: No dysuria, frequency, hematuria, or incontinence  NEUROLOGICAL: No headaches, memory loss, loss of strength, numbness, or tremors  SKIN: No itching, burning, rashes, or lesions   LYMPH NODES: No enlarged glands  ENDOCRINE: No heat or cold intolerance; No hair loss  MUSCULOSKELETAL: No joint pain or swelling; No muscle, back, or extremity pain  PSYCHIATRIC: No depression, anxiety, mood swings, or difficulty sleeping  HEME/LYMPH: No easy bruising, or bleeding gums  ALLERGY AND IMMUNOLOGIC: No hives or eczema          SOCIAL HISTORY:    FAMILY HISTORY:      Vital Signs Last 24 Hrs  T(C): 37.1 (10 Guru 2020 07:40), Max: 37.1 (10 Guru 2020 07:40)  T(F): 98.8 (10 Guru 2020 07:40), Max: 98.8 (10 Guru 2020 07:40)  HR: 90 (10 Guru 2020 09:35) (90 - 116)  BP: 159/77 (10 Guru 2020 09:35) (159/77 - 182/96)  BP(mean): --  RR: 18 (10 Guru 2020 07:40) (18 - 18)  SpO2: 96% (10 Guru 2020 07:40) (95% - 96%)    PHYSICAL EXAM:    GENERAL: NAD, well-groomed, well-developed  HEAD:  Atraumatic, Normocephalic  EYES: EOMI, PERRLA, conjunctiva and sclera clear  NECK: Supple, No JVD, Normal thyroid  NERVOUS SYSTEM:  Alert & Oriented X3, Good concentration; Motor Strength 5/5 B/L upper and lower extremities;   CHEST/LUNG: Clear to percussion bilaterally; No rales, rhonchi, wheezing, or rubs  HEART: Regular rate and rhythm; No murmurs, rubs, or gallops  ABDOMEN: Soft, Nontender, Nondistended; Bowel sounds present  EXTREMITIES:  2+ Peripheral Pulses, No clubbing, cyanosis, or edema  LYMPH: No lymphadenopathy noted   RECTAL:  Deferred   SKIN: No rashes or lesions    LABS:                        13.1   7.53  )-----------( 324      ( 10 Guru 2020 03:12 )             41.4       CBC:  06-10 @ 03:12  WBC  7.53  HGB 13.1  HCT 41.4 Plate 324  MCV 70.4           10 Guru 2020 03:12    134    |  98     |  17     ----------------------------<  156    4.3     |  24     |  1.09     Ca    9.5        10 Guru 2020 03:12    TPro  8.7    /  Alb  2.8    /  TBili  3.3    /  DBili  2.37   /  AST  318    /  ALT  116    /  AlkPhos  236    10 Guru 2020 03:12    PT/INR - ( 10 Guru 2020 09:53 )   PT: 13.0 sec;   INR: 1.17 ratio         PTT - ( 10 Guru 2020 09:53 )  PTT:189.2 sec  Urinalysis Basic - ( 10 Guru 2020 03:33 )    Color: Yellow / Appearance: Clear / S.010 / pH: x  Gluc: x / Ketone: Small  / Bili: Small / Urobili: 1 mg/dL   Blood: x / Protein: 30 mg/dL / Nitrite: Negative   Leuk Esterase: Negative / RBC: 0-2 /HPF / WBC 0-2   Sq Epi: x / Non Sq Epi: Occasional / Bacteria: x          RADIOLOGY & ADDITIONAL STUDIES:  < from: CT Abdomen and Pelvis w/ IV Cont (06.10.20 @ 05:45) >    EXAM:  CT ABDOMEN AND PELVIS IC                            PROCEDURE DATE:  06/10/2020          INTERPRETATION:  EXAMINATION: CT SCAN OF THE ABDOMEN AND PELVIS WITH INTRAVENOUS CONTRAST    DATE OF EXAM: 6/10/2020 5:45 AM    HISTORY: Right-sided abdominal pain and elevated liver enzymes    COMPARISON: None.    TECHNIQUE: CT examination of the abdomen and pelvis was performed following the intravenous administration of 90 mL Optiray 350. 35 mL Optiray 350 discarded.  CT dose lowering techniques were used, to include: automated exposure control, adjustment for patient size, and/or use of iterative reconstruction.    FINDINGS:    ABDOMEN/PELVIS:    Lower Chest: Clear    Liver: Severe hepatomegaly. Areas of nodular hypoenhancement. Nodularity ofthe hepatic capsule.    Gallbladder/Billary: Decompressed    Pancreas: Within normal limits    Spleen: Mildly enlarged    Adrenal Glands: Within normal limits    Kidneys: 2.9 cm complex cystic mass arising from the upper pole right kidney, with areasof peripheral calcification and central density measurement higher than simple fluid.    GI Tract: Diverticulosis coli. Normal appendix. No bowel obstruction.    Mesentery/Peritoneum: Mild perihepatic ascites. No free air.    Vasculature: Thrombosis of the right portal vein appears acute. Surgical clips in the adjacent rahul hepatis. Main portal vein is enlarged. Superior mesenteric vein appears patent. Atherosclerotic change of the abdominal aorta without aneurysm.    Lymph Nodes: No enlarged lymph node measuring greater than 10 mm in short axis.    Abdominal Wall: Intact    Bladder: Within normal limits    Reproductive: Prostate is normal in caliber.    Musculoskeletal: No acute osseous abnormality.      IMPRESSION:    1.  Acute thrombosis of the right portal vein. Critical results discussed with Dr. Bolden at 6 AM.   2.  Hepatic cirrhosis and stigmata portal venous hypertension.  3.  Indeterminate cystic mass arising from the upper pole right kidney, nonemergent dedicated sonographic evaluation is recommended.                        HILARIO NATHAN M.D., ATTENDING RADIOLOGIST    < end of copied text >

## 2020-06-10 NOTE — H&P ADULT - NSHPLABSRESULTS_GEN_ALL_CORE
Recent Vitals  T(C): 36.7 (06-10-20 @ 03:35), Max: 36.7 (06-10-20 @ 01:37)  HR: 98 (06-10-20 @ 03:35) (98 - 116)  BP: 166/82 (06-10-20 @ 03:35) (166/82 - 182/96)  RR: 18 (06-10-20 @ 03:35) (18 - 18)  SpO2: 96% (06-10-20 @ 03:35) (95% - 96%)                        13.1   7.53  )-----------( 324      ( 10 Guru 2020 03:12 )             41.4     06-10    134<L>  |  98  |  17  ----------------------------<  156<H>  4.3   |  24  |  1.09    Ca    9.5      10 Guru 2020 03:12    TPro  8.7<H>  /  Alb  2.8<L>  /  TBili  3.3<H>  /  DBili  2.37<H>  /  AST  318<H>  /  ALT  116<H>  /  AlkPhos  236<H>  06-10      LIVER FUNCTIONS - ( 10 Guru 2020 03:12 )  Alb: 2.8 g/dL / Pro: 8.7 gm/dL / ALK PHOS: 236 U/L / ALT: 116 U/L / AST: 318 U/L / GGT: x           Urinalysis Basic - ( 10 Guru 2020 03:33 )    Color: Yellow / Appearance: Clear / S.010 / pH: x  Gluc: x / Ketone: Small  / Bili: Small / Urobili: 1 mg/dL   Blood: x / Protein: 30 mg/dL / Nitrite: Negative   Leuk Esterase: Negative / RBC: 0-2 /HPF / WBC 0-2   Sq Epi: x / Non Sq Epi: Occasional / Bacteria: x        Home Medications:

## 2020-06-10 NOTE — H&P ADULT - NSHPREVIEWOFSYSTEMS_GEN_ALL_CORE
Constitutional: no fever, chills, night sweats  Ears: no hearing changes or ear pain,   Nose: no nasal congestion, sinus pain, or rhinorrhea  Cardio: no chest pain, orthopnea, edema, or palpitations  Resp: no dyspnea, cough, wheezing  GI: decreased appetite positive.  No nausea, vomiting, diarrhea, constipation, hematochezia, or melena  : no dysuria, urinary frequency, hematuria  MSK: no back pain, neck pain  Skin: no rash, pruritis   Neuro: no weakness, dizziness, lightheadedness, syncope   Heme/Lymph: no bruising or bleeding

## 2020-06-10 NOTE — ED PROVIDER NOTE - OBJECTIVE STATEMENT
64 year old male with h/o HTN presents today c/o two week h/o abdominal pain, pt describes severe, intermittent pains which radiates into his right flank and upper abdominal area (-) nausea or vomiting (-) fevers or chills

## 2020-06-10 NOTE — ED PROVIDER NOTE - CONSTITUTIONAL, MLM
normal... Well appearing, awake, alert, oriented to person, place, time/situation and in no apparent distress, pt kevin pain at this time

## 2020-06-10 NOTE — CONSULT NOTE ADULT - ASSESSMENT
HPI:  Patient is a 64M with a PMH of HTN, DM, and Liver Ca? who presents to the ED for abdominal pain.  Patient reports that he has had sporadic episodes of lower R sided abdominal pain for the past week.  8/10 in severity with some radiation to the back.  Reports the pain is worse when he takes deep breaths.  Patient endores associated reduced PO intake due to pain and denies hx of constipation, diarrhea, fever, chills, nausea or vomiting.  Patient states that his urine has appeared more yellow than normal and he has noticed this discoloration for the past 2-3 weeks.  Patient states that he was diagnosed with liver cancer about 2 years ago and had started treatment (possibly chemo?) at [facility] however he was not able to complete his treatment as he had to move from [place] to [place] this year.  Denies history of hepatitis.  Unsure about medical history in general.  Has been following with a hepatologist - scheduled for MRI abdomen today.  Mild tachycardia in ED.  Labs show decreased albumin, increased alk phos, bilirubin and transaminitis.  US abdomen benign.  CT abdomen shows portal vein thrombosis - started on heparin drip.  Will admit to floor.        Eddi Calles - hepatologist (10 Guru 2020 06:58)  ------------------------------------------- As Above -----------------------------------  The patient presents with 7 days of vague abdominal pain. Not associated with N/V. Slowly worsening. Denies trauma. The patient denies melena, hematochezia, hematemesis, constipation, diarrhea, or change in bowel movements   Patient states that he has a history of liver cancer 2 years ago status post chemotherapy ( directly applied to liver )   Denies ETOH abuse and stopped drinking a while ago. CT scan reveals cirrhosis and PVT  Patient denies a history of Cirrhosis  Last colonoscopy was many years ago.   Patient being followed by Dr Calles ( Hepatology [phone] ) ( Was seen once )   CT scan c/w Cirrhosis and PVT - was started on heparin in the ER     Cirrhosis, PVT ( new ? ), patient was to have a MRI outpatient today.  - 1) Left message with Dr Calles  2) Agree with anticoagulation 3) Oncology consult

## 2020-06-10 NOTE — ED PROVIDER NOTE - CLINICAL SUMMARY MEDICAL DECISION MAKING FREE TEXT BOX
pt presents with two weeks h/o right sided abdominal pain, elevated lfts on labs, sono negative for acute cholecystitis, ct shows acute portal vein thrombosis, full dose heparin started, vascular consult called

## 2020-06-10 NOTE — H&P ADULT - PROBLEM SELECTOR PLAN 1
Thrombosis seen on CT.  Started on heparin drip  ED to reach out to vascular surgery.  will make NPO for now  SHARON Austin. Thrombosis seen on CT.  Started on heparin drip  will make NPO for now.  SHARON Austin.

## 2020-06-11 LAB
24R-OH-CALCIDIOL SERPL-MCNC: 11.2 NG/ML — LOW (ref 30–80)
A1C WITH ESTIMATED AVERAGE GLUCOSE RESULT: 8.9 % — HIGH (ref 4–5.6)
AFP-TM SERPL-MCNC: HIGH NG/ML
ALBUMIN SERPL ELPH-MCNC: 2.5 G/DL — LOW (ref 3.3–5)
ALP SERPL-CCNC: 220 U/L — HIGH (ref 40–120)
ALT FLD-CCNC: 120 U/L — HIGH (ref 12–78)
ANION GAP SERPL CALC-SCNC: 12 MMOL/L — SIGNIFICANT CHANGE UP (ref 5–17)
APTT BLD: 67.8 SEC — HIGH (ref 27.5–36.3)
AST SERPL-CCNC: 389 U/L — HIGH (ref 15–37)
BILIRUB DIRECT SERPL-MCNC: 2.74 MG/DL — HIGH (ref 0.05–0.2)
BILIRUB INDIRECT FLD-MCNC: 1.3 MG/DL — HIGH (ref 0.2–1)
BILIRUB SERPL-MCNC: 4 MG/DL — HIGH (ref 0.2–1.2)
BUN SERPL-MCNC: 15 MG/DL — SIGNIFICANT CHANGE UP (ref 7–23)
CALCIUM SERPL-MCNC: 9 MG/DL — SIGNIFICANT CHANGE UP (ref 8.5–10.1)
CHLORIDE SERPL-SCNC: 102 MMOL/L — SIGNIFICANT CHANGE UP (ref 96–108)
CO2 SERPL-SCNC: 22 MMOL/L — SIGNIFICANT CHANGE UP (ref 22–31)
CREAT SERPL-MCNC: 0.9 MG/DL — SIGNIFICANT CHANGE UP (ref 0.5–1.3)
CULTURE RESULTS: SIGNIFICANT CHANGE UP
ESTIMATED AVERAGE GLUCOSE: 209 MG/DL — HIGH (ref 68–114)
GLUCOSE BLDC GLUCOMTR-MCNC: 109 MG/DL — HIGH (ref 70–99)
GLUCOSE BLDC GLUCOMTR-MCNC: 114 MG/DL — HIGH (ref 70–99)
GLUCOSE BLDC GLUCOMTR-MCNC: 121 MG/DL — HIGH (ref 70–99)
GLUCOSE BLDC GLUCOMTR-MCNC: 156 MG/DL — HIGH (ref 70–99)
GLUCOSE BLDC GLUCOMTR-MCNC: 167 MG/DL — HIGH (ref 70–99)
GLUCOSE SERPL-MCNC: 121 MG/DL — HIGH (ref 70–99)
HCT VFR BLD CALC: 41 % — SIGNIFICANT CHANGE UP (ref 39–50)
HCT VFR BLD CALC: 42.2 % — SIGNIFICANT CHANGE UP (ref 39–50)
HCV AB S/CO SERPL IA: 0.16 S/CO — SIGNIFICANT CHANGE UP (ref 0–0.99)
HCV AB SERPL-IMP: SIGNIFICANT CHANGE UP
HGB BLD-MCNC: 12.9 G/DL — LOW (ref 13–17)
HGB BLD-MCNC: 13.4 G/DL — SIGNIFICANT CHANGE UP (ref 13–17)
MAGNESIUM SERPL-MCNC: 2.5 MG/DL — SIGNIFICANT CHANGE UP (ref 1.6–2.6)
MCHC RBC-ENTMCNC: 22.3 PG — LOW (ref 27–34)
MCHC RBC-ENTMCNC: 22.5 PG — LOW (ref 27–34)
MCHC RBC-ENTMCNC: 31.5 GM/DL — LOW (ref 32–36)
MCHC RBC-ENTMCNC: 31.8 GM/DL — LOW (ref 32–36)
MCV RBC AUTO: 70.8 FL — LOW (ref 80–100)
MCV RBC AUTO: 70.9 FL — LOW (ref 80–100)
NRBC # BLD: 0 /100 WBCS — SIGNIFICANT CHANGE UP (ref 0–0)
NRBC # BLD: 0 /100 WBCS — SIGNIFICANT CHANGE UP (ref 0–0)
PHOSPHATE SERPL-MCNC: 3.3 MG/DL — SIGNIFICANT CHANGE UP (ref 2.5–4.5)
PLATELET # BLD AUTO: 311 K/UL — SIGNIFICANT CHANGE UP (ref 150–400)
PLATELET # BLD AUTO: 357 K/UL — SIGNIFICANT CHANGE UP (ref 150–400)
POTASSIUM SERPL-MCNC: 4.3 MMOL/L — SIGNIFICANT CHANGE UP (ref 3.5–5.3)
POTASSIUM SERPL-SCNC: 4.3 MMOL/L — SIGNIFICANT CHANGE UP (ref 3.5–5.3)
PROT SERPL-MCNC: 8.5 GM/DL — HIGH (ref 6–8.3)
RBC # BLD: 5.78 M/UL — SIGNIFICANT CHANGE UP (ref 4.2–5.8)
RBC # BLD: 5.96 M/UL — HIGH (ref 4.2–5.8)
RBC # FLD: 19.7 % — HIGH (ref 10.3–14.5)
RBC # FLD: 20.1 % — HIGH (ref 10.3–14.5)
SODIUM SERPL-SCNC: 136 MMOL/L — SIGNIFICANT CHANGE UP (ref 135–145)
SPECIMEN SOURCE: SIGNIFICANT CHANGE UP
WBC # BLD: 5.9 K/UL — SIGNIFICANT CHANGE UP (ref 3.8–10.5)
WBC # BLD: 6.71 K/UL — SIGNIFICANT CHANGE UP (ref 3.8–10.5)
WBC # FLD AUTO: 5.9 K/UL — SIGNIFICANT CHANGE UP (ref 3.8–10.5)
WBC # FLD AUTO: 6.71 K/UL — SIGNIFICANT CHANGE UP (ref 3.8–10.5)

## 2020-06-11 PROCEDURE — 76775 US EXAM ABDO BACK WALL LIM: CPT | Mod: 26

## 2020-06-11 PROCEDURE — 99233 SBSQ HOSP IP/OBS HIGH 50: CPT

## 2020-06-11 PROCEDURE — 74183 MRI ABD W/O CNTR FLWD CNTR: CPT | Mod: 26

## 2020-06-11 RX ORDER — KETOROLAC TROMETHAMINE 30 MG/ML
15 SYRINGE (ML) INJECTION ONCE
Refills: 0 | Status: DISCONTINUED | OUTPATIENT
Start: 2020-06-11 | End: 2020-06-11

## 2020-06-11 RX ORDER — DILTIAZEM HCL 120 MG
180 CAPSULE, EXT RELEASE 24 HR ORAL DAILY
Refills: 0 | Status: DISCONTINUED | OUTPATIENT
Start: 2020-06-12 | End: 2020-06-12

## 2020-06-11 RX ORDER — HEPARIN SODIUM 5000 [USP'U]/ML
8000 INJECTION INTRAVENOUS; SUBCUTANEOUS EVERY 6 HOURS
Refills: 0 | Status: DISCONTINUED | OUTPATIENT
Start: 2020-06-11 | End: 2020-06-11

## 2020-06-11 RX ORDER — HYDRALAZINE HCL 50 MG
5 TABLET ORAL EVERY 6 HOURS
Refills: 0 | Status: DISCONTINUED | OUTPATIENT
Start: 2020-06-11 | End: 2020-06-12

## 2020-06-11 RX ORDER — HEPARIN SODIUM 5000 [USP'U]/ML
INJECTION INTRAVENOUS; SUBCUTANEOUS
Qty: 25000 | Refills: 0 | Status: DISCONTINUED | OUTPATIENT
Start: 2020-06-11 | End: 2020-06-11

## 2020-06-11 RX ORDER — HEPARIN SODIUM 5000 [USP'U]/ML
4000 INJECTION INTRAVENOUS; SUBCUTANEOUS EVERY 6 HOURS
Refills: 0 | Status: DISCONTINUED | OUTPATIENT
Start: 2020-06-11 | End: 2020-06-11

## 2020-06-11 RX ORDER — ENOXAPARIN SODIUM 100 MG/ML
100 INJECTION SUBCUTANEOUS
Refills: 0 | Status: DISCONTINUED | OUTPATIENT
Start: 2020-06-11 | End: 2020-06-12

## 2020-06-11 RX ORDER — ERGOCALCIFEROL 1.25 MG/1
50000 CAPSULE ORAL
Refills: 0 | Status: DISCONTINUED | OUTPATIENT
Start: 2020-06-11 | End: 2020-06-12

## 2020-06-11 RX ORDER — ENOXAPARIN SODIUM 100 MG/ML
100 INJECTION SUBCUTANEOUS
Refills: 0 | Status: DISCONTINUED | OUTPATIENT
Start: 2020-06-11 | End: 2020-06-11

## 2020-06-11 RX ADMIN — Medication 1: at 16:52

## 2020-06-11 RX ADMIN — Medication 15 MILLIGRAM(S): at 06:07

## 2020-06-11 RX ADMIN — ENOXAPARIN SODIUM 100 MILLIGRAM(S): 100 INJECTION SUBCUTANEOUS at 21:52

## 2020-06-11 RX ADMIN — LISINOPRIL 10 MILLIGRAM(S): 2.5 TABLET ORAL at 06:08

## 2020-06-11 RX ADMIN — AMLODIPINE BESYLATE 10 MILLIGRAM(S): 2.5 TABLET ORAL at 06:08

## 2020-06-11 RX ADMIN — ENOXAPARIN SODIUM 100 MILLIGRAM(S): 100 INJECTION SUBCUTANEOUS at 12:00

## 2020-06-11 RX ADMIN — HEPARIN SODIUM 1800 UNIT(S)/HR: 5000 INJECTION INTRAVENOUS; SUBCUTANEOUS at 08:05

## 2020-06-11 NOTE — PROGRESS NOTE ADULT - ASSESSMENT
64M with a PMH of HTN, DM, and Liver Ca? who presents to the ED for abdominal pain.  Patient reports that he has had sporadic episodes of lower R sided abdominal pain for the past week.        Assessment and Plan:     #Acute Rt Portal vein thrombosis, switched to ther lovenox bid, will d/c with DOAC   Liver cirrhosis with liver cancer--was getting [id number] at [place] s/p one local treatment  follows with hepatologist outpatient   AFP Alpha Fetoprotein - Tumor Marker: >23927.0  MRI abd/pel noted : < from: MR Abdomen w/wo IV Cont (06.11.20 @ 15:47) >  Nodular contour and capsular retraction. Numerous heterogeneously enhancing lesions, predominantly within the enlarged right hepatic lobe. Intrahepatic portal vein thrombosis, predominantly right.  heme/onc and GI consult appreciated .   Discussed with ID, no c/i to d/c with outpt follow-up with his hepatologist and oncologist   Patient being followed by Dr Calles ( Hepatology [phone] )      #Transaminitis and elevated hyperbilirubinemia (mainly direct), most likely sec to liver cancer and portal vein thrombosis   follow LFTs   GI following     #HTN--changed amlodipine to cardizem, continue with ACEI, hydralazine IV prn for SBP >160       #DM2  A1C 8.9%  ISS   FS goal 140-180  CHO diet     #Rt Renal cyst  Renal US: There is a 2.5 cm cyst in the upper pole Rt Kidney   follow-up outpt     #Preventative measures   lovenox   general precautions 64M with a PMH of HTN, DM, and Liver Ca? who presents to the ED for abdominal pain.  Patient reports that he has had sporadic episodes of lower R sided abdominal pain for the past week.        Assessment and Plan:     #Acute Rt Portal vein thrombosis, switched to ther lovenox bid, will d/c with DOAC   Liver cirrhosis with liver cancer--was getting [id number] at [place] s/p one local treatment  follows with hepatologist outpatient   AFP Alpha Fetoprotein - Tumor Marker: >86918.0  MRI abd/pel noted : < from: MR Abdomen w/wo IV Cont (06.11.20 @ 15:47) >  Nodular contour and capsular retraction. Numerous heterogeneously enhancing lesions, predominantly within the enlarged right hepatic lobe. Intrahepatic portal vein thrombosis, predominantly right.  heme/onc and GI consult appreciated .   Discussed with ID, no c/i to d/c with outpt follow-up with his hepatologist and oncologist   Patient being followed by Dr Calles ( Hepatology [phone] )      #Transaminitis and elevated hyperbilirubinemia (mainly direct), most likely sec to liver cancer and portal vein thrombosis   follow LFTs   GI following     #HTN--changed amlodipine to cardizem, continue with ACEI, hydralazine IV prn for SBP >160       #DM2  A1C 8.9%  ISS   FS goal 140-180  CHO diet     #Rt Renal cyst  Renal US: There is a 2.5 cm cyst in the upper pole Rt Kidney   follow-up outpt     #Vitamin D deficiency --vit D level 11  supplement vit D 50,000iu weekly  x 8 weeks     #Preventative measures   lovenox   general precautions

## 2020-06-11 NOTE — PROGRESS NOTE ADULT - SUBJECTIVE AND OBJECTIVE BOX
lying in bed    Vital Signs Last 24 Hrs  T(C): 36.8 (2020 06:53), Max: 36.9 (10 Guru 2020 11:31)  T(F): 98.2 (2020 06:53), Max: 98.5 (10 Guru 2020 11:31)  HR: 88 (2020 07:02) (86 - 95)  BP: 156/70 (2020 06:53) (146/81 - 169/82)  BP(mean): --  RR: 18 (2020 06:53) (17 - 19)  SpO2: 95% (2020 06:53) (94% - 98%)    PHYSICAL EXAM:    general - AAO x 3  HEENT - No Icterus  CVS - RRR  RS - AE B/L  Abd - soft, NT  Ext - Pulses +        LABS:                        12.9   5.90  )-----------( 311      ( 2020 06:35 )             41.0     06-11    136  |  102  |  15  ----------------------------<  121<H>  4.3   |  22  |  0.90    Ca    9.0      2020 06:35  Phos  3.3     06-11  Mg     2.5     06-11    TPro  8.5<H>  /  Alb  2.5<L>  /  TBili  4.0<H>  /  DBili  2.74<H>  /  AST  389<H>  /  ALT  120<H>  /  AlkPhos  220<H>  06-11    PT/INR - ( 10 Guru 2020 09:53 )   PT: 13.0 sec;   INR: 1.17 ratio         PTT - ( 2020 06:35 )  PTT:67.8 sec  Urinalysis Basic - ( 10 Guru 2020 03:33 )    Color: Yellow / Appearance: Clear / S.010 / pH: x  Gluc: x / Ketone: Small  / Bili: Small / Urobili: 1 mg/dL   Blood: x / Protein: 30 mg/dL / Nitrite: Negative   Leuk Esterase: Negative / RBC: 0-2 /HPF / WBC 0-2   Sq Epi: x / Non Sq Epi: Occasional / Bacteria: x        Culture - Urine (collected 10 Guru 2020 08:52)  Source: .Urine Clean Catch (Midstream)  Final Report (2020 08:18):    <10,000 CFU/mL Normal Urogenital Flavia        RADIOLOGY & ADDITIONAL STUDIES:

## 2020-06-11 NOTE — PROGRESS NOTE ADULT - SUBJECTIVE AND OBJECTIVE BOX
Patient is a 64y old  Male who presents with a chief complaint of portal vein thrombosis (2020 09:58)      HPI:  Patient is a 64M with a PMH of HTN, DM, and Liver Ca? who presents to the ED for abdominal pain.  Patient reports that he has had sporadic episodes of lower R sided abdominal pain for the past week.  8/10 in severity with some radiation to the back.  Reports the pain is worse when he takes deep breaths.  Patient endores associated reduced PO intake due to pain and denies hx of constipation, diarrhea, fever, chills, nausea or vomiting.  Patient states that his urine has appeared more yellow than normal and he has noticed this discoloration for the past 2-3 weeks.  Patient states that he was diagnosed with liver cancer about 2 years ago and had started treatment (possibly chemo?) at [facility] however he was not able to complete his treatment as he had to move from [place] to [place] this year.  Denies history of hepatitis.  Unsure about medical history in general.  Has been following with a hepatologist - scheduled for MRI abdomen today.  Mild tachycardia in ED.  Labs show decreased albumin, increased alk phos, bilirubin and transaminitis.  US abdomen benign.  CT abdomen shows portal vein thrombosis - started on heparin drip.  Will admit to floor.        Eddi Calles - hepatologist (10 Guru 2020 06:58)      INTERVAL HPI/OVERNIGHT EVENTS:  Less RUQ abdominal pain No N/V. On IV heparin. Son states patient had stents placed for chemotherapy    MEDICATIONS  (STANDING):  amLODIPine   Tablet 10 milliGRAM(s) Oral daily  dextrose 5%. 1000 milliLiter(s) (50 mL/Hr) IV Continuous <Continuous>  dextrose 50% Injectable 12.5 Gram(s) IV Push once  dextrose 50% Injectable 25 Gram(s) IV Push once  dextrose 50% Injectable 25 Gram(s) IV Push once  enoxaparin Injectable 100 milliGRAM(s) SubCutaneous two times a day  insulin lispro (HumaLOG) corrective regimen sliding scale   SubCutaneous three times a day before meals  lisinopril 10 milliGRAM(s) Oral daily    MEDICATIONS  (PRN):  dextrose 40% Gel 15 Gram(s) Oral once PRN Blood Glucose LESS THAN 70 milliGRAM(s)/deciliter  glucagon  Injectable 1 milliGRAM(s) IntraMuscular once PRN Glucose LESS THAN 70 milligrams/deciliter      FAMILY HISTORY:      Allergies    No Known Allergies    Intolerances        PMH/PSH:  Hypertension  No significant past surgical history        REVIEW OF SYSTEMS:  CONSTITUTIONAL: No fever, weight loss, or fatigue  EYES: No eye pain, visual disturbances, or discharge  ENMT:  No difficulty hearing, tinnitus, vertigo; No sinus or throat pain  NECK: No pain or stiffness  BREASTS: No pain, masses, or nipple discharge  RESPIRATORY: No cough, wheezing, chills or hemoptysis; No shortness of breath  CARDIOVASCULAR: No chest pain, palpitations, dizziness, or leg swelling  GASTROINTESTINAL:  See above  GENITOURINARY: No dysuria, frequency, hematuria, or incontinence  NEUROLOGICAL: No headaches, memory loss, loss of strength, numbness, or tremors  SKIN: No itching, burning, rashes, or lesions   LYMPH NODES: No enlarged glands  ENDOCRINE: No heat or cold intolerance; No hair loss  MUSCULOSKELETAL: No joint pain or swelling; No muscle, back, or extremity pain  PSYCHIATRIC: No depression, anxiety, mood swings, or difficulty sleeping  HEME/LYMPH: No easy bruising, or bleeding gums  ALLERGY AND IMMUNOLOGIC: No hives or eczema    Vital Signs Last 24 Hrs  T(C): 36.8 (2020 06:53), Max: 36.9 (10 Guru 2020 14:11)  T(F): 98.2 (2020 06:53), Max: 98.5 (10 Guru 2020 16:45)  HR: 88 (2020 07:02) (88 - 95)  BP: 156/70 (2020 06:53) (155/74 - 169/82)  BP(mean): --  RR: 18 (2020 06:53) (18 - 19)  SpO2: 95% (2020 06:53) (94% - 96%)    PHYSICAL EXAM:  GENERAL: NAD, well-groomed, well-developed  HEAD:  Atraumatic, Normocephalic  EYES: EOMI, PERRLA, conjunctiva and sclera clear  ENMT: No tonsillar erythema, exudates, or enlargement; Moist mucous membranes, Good dentition, No lesions  NECK: Supple, No JVD, Normal thyroid  NERVOUS SYSTEM:  Alert & Oriented X3, Good concentration; Motor Strength 5/5 B/L upper and lower extremities;   CHEST/LUNG: Clear to percussion bilaterally; No rales, rhonchi, wheezing, or rubs  HEART: Regular rate and rhythm; No murmurs, rubs, or gallops  ABDOMEN: Soft, Nontender, Nondistended; Bowel sounds present  EXTREMITIES:  2+ Peripheral Pulses, No clubbing, cyanosis, or edema  LYMPH: No lymphadenopathy noted  SKIN: No rashes or lesions    LAB  06-10 @ 03:12  amylase 82   lipase 344                           12.9   5.90  )-----------( 311      ( 2020 06:35 )             41.0       CBC:   06:35  WBC 5.90   Hgb 12.9   Hct 41.0   Plts 311  MCV 70.9  06-10 @ 14:41  WBC 6.97   Hgb 12.6   Hct 40.5   Plts 324  MCV 71.8  06-10 @ 03:12  WBC 7.53   Hgb 13.1   Hct 41.4   Plts 324  MCV 70.4      Chemistry:   @ 06:35  Na+ 136  K+ 4.3  Cl- 102  CO2 22  BUN 15  Cr 0.90     06-10 @ 03:12  Na+ 134  K+ 4.3  Cl- 98  CO2 24  BUN 17  Cr 1.09         Glucose, Serum: 121 mg/dL ( @ 06:35)  Glucose, Serum: 156 mg/dL (06-10 @ 03:12)      2020 06:35    136    |  102    |  15     ----------------------------<  121    4.3     |  22     |  0.90   10 Guru 2020 03:12    134    |  98     |  17     ----------------------------<  156    4.3     |  24     |  1.09     Ca    9.0        2020 06:35  Ca    9.5        10 Guru 2020 03:12  Phos  3.3       2020 06:35  Mg     2.5       2020 06:35    TPro  8.5    /  Alb  2.5    /  TBili  4.0    /  DBili  2.74   /  AST  389    /  ALT  120    /  AlkPhos  220    2020 06:35  TPro  8.7    /  Alb  2.8    /  TBili  3.3    /  DBili  2.37   /  AST  318    /  ALT  116    /  AlkPhos  236    10 Guru 2020 03:12      PT/INR - ( 10 Guru 2020 09:53 )   PT: 13.0 sec;   INR: 1.17 ratio         PTT - ( 2020 06:35 )  PTT:67.8 sec    Urinalysis Basic - ( 10 Guru 2020 03:33 )    Color: Yellow / Appearance: Clear / S.010 / pH: x  Gluc: x / Ketone: Small  / Bili: Small / Urobili: 1 mg/dL   Blood: x / Protein: 30 mg/dL / Nitrite: Negative   Leuk Esterase: Negative / RBC: 0-2 /HPF / WBC 0-2   Sq Epi: x / Non Sq Epi: Occasional / Bacteria: x        CAPILLARY BLOOD GLUCOSE      POCT Blood Glucose.: 121 mg/dL (2020 11:03)  POCT Blood Glucose.: 109 mg/dL (2020 06:49)  POCT Blood Glucose.: 114 mg/dL (2020 00:43)  POCT Blood Glucose.: 126 mg/dL (10 Guru 2020 16:35)          RADIOLOGY & ADDITIONAL TESTS:    Imaging Personally Reviewed:  [ ] YES  [ ] NO    Consultant(s) Notes Reviewed:  [ ] YES  [ ] NO    Care Discussed with Consultants/Other Providers [ ] YES  [ ] NO

## 2020-06-11 NOTE — PROGRESS NOTE ADULT - SUBJECTIVE AND OBJECTIVE BOX
HPI:  Patient is a 64M with a PMH of HTN, DM, and Liver Ca? who presents to the ED for abdominal pain.  Patient reports that he has had sporadic episodes of lower R sided abdominal pain for the past week.  8/10 in severity with some radiation to the back.  Reports the pain is worse when he takes deep breaths.  Patient endores associated reduced PO intake due to pain and denies hx of constipation, diarrhea, fever, chills, nausea or vomiting.  Patient states that his urine has appeared more yellow than normal and he has noticed this discoloration for the past 2-3 weeks.  Patient states that he was diagnosed with liver cancer about 2 years ago and had started treatment (possibly chemo?) at [facility] however he was not able to complete his treatment as he had to move from [place] to [place] this year.  Denies history of hepatitis.  Unsure about medical history in general.  Has been following with a hepatologist - scheduled for MRI abdomen today.  Mild tachycardia in ED.  Labs show decreased albumin, increased alk phos, bilirubin and transaminitis.  US abdomen benign.  CT abdomen shows portal vein thrombosis - started on heparin drip.  Will admit to floor.        Eddi Calles - hepatologist (10 Guru 2020 06:58)      SUBJECTIVE & OBJECTIVE: Pt seen and examined at bedside.   no overnight events.   states RUQ pain has improved     PHYSICAL EXAM:  T(C): 36.4 (20 @ 16:35), Max: 36.8 (06-10-20 @ 23:39)  HR: 95 (20 @ 16:35) (86 - 95)  BP: 170/88 (20 @ 16:35) (154/70 - 170/88)  RR: 18 (20 @ 16:35) (18 - 18)  SpO2: 96% (20 @ 16:35) (93% - 96%)  Wt(kg): --   Weight (kg): 99.8 (06-10 @ 23:39)  I&O's Detail    10 Guru 2020 07:01  -  2020 07:00  --------------------------------------------------------  IN:  Total IN: 0 mL    OUT:    Voided: 1200 mL  Total OUT: 1200 mL    Total NET: -1200 mL      2020 07:01  -  2020 20:50  --------------------------------------------------------  IN:    Oral Fluid: 240 mL  Total IN: 240 mL    OUT:  Total OUT: 0 mL    Total NET: 240 mL          MEDICATIONS  (STANDING):  dextrose 5%. 1000 milliLiter(s) (50 mL/Hr) IV Continuous <Continuous>  dextrose 50% Injectable 12.5 Gram(s) IV Push once  dextrose 50% Injectable 25 Gram(s) IV Push once  dextrose 50% Injectable 25 Gram(s) IV Push once  enoxaparin Injectable 100 milliGRAM(s) SubCutaneous two times a day  ergocalciferol 30096 Unit(s) Oral every week  insulin lispro (HumaLOG) corrective regimen sliding scale   SubCutaneous three times a day before meals  lisinopril 10 milliGRAM(s) Oral daily    MEDICATIONS  (PRN):  dextrose 40% Gel 15 Gram(s) Oral once PRN Blood Glucose LESS THAN 70 milliGRAM(s)/deciliter  glucagon  Injectable 1 milliGRAM(s) IntraMuscular once PRN Glucose LESS THAN 70 milligrams/deciliter  hydrALAZINE Injectable 5 milliGRAM(s) IV Push every 6 hours PRN if SBP >160    PEx   GENERAL: NAD   HEAD:  Atraumatic, Normocephalic  EYES: EOMI, PERRLA, conjunctiva and sclera clear  ENMT: No tonsillar erythema, exudates, or enlargement; Moist mucous membranes  NECK: Supple, No JVD  NERVOUS SYSTEM:  Alert & Oriented X3, Good concentration; grossly nonfocal   CHEST/LUNG: CTAB  HEART: Regular rate and rhythm; No murmurs, rubs, or gallops  ABDOMEN: Soft, mild RUQ pain to palpation, Nondistended; Bowel sounds present  EXTREMITIES:  2+ Peripheral Pulses b/l, No clubbing, cyanosis, or edema b/l         LABS:                        13.4   6.71  )-----------( 357      ( 2020 17:22 )             42.2     06-11    136  |  102  |  15  ----------------------------<  121<H>  4.3   |  22  |  0.90    Ca    9.0      2020 06:35  Phos  3.3       Mg     2.5         TPro  8.5<H>  /  Alb  2.5<L>  /  TBili  4.0<H>  /  DBili  2.74<H>  /  AST  389<H>  /  ALT  120<H>  /  AlkPhos  220<H>      PT/INR - ( 10 Guru 2020 09:53 )   PT: 13.0 sec;   INR: 1.17 ratio         PTT - ( 2020 06:35 )  PTT:67.8 sec  Urinalysis Basic - ( 10 Gruu 2020 03:33 )    Color: Yellow / Appearance: Clear / S.010 / pH: x  Gluc: x / Ketone: Small  / Bili: Small / Urobili: 1 mg/dL   Blood: x / Protein: 30 mg/dL / Nitrite: Negative   Leuk Esterase: Negative / RBC: 0-2 /HPF / WBC 0-2   Sq Epi: x / Non Sq Epi: Occasional / Bacteria: x      Magnesium, Serum: 2.5 mg/dL ( @ 06:35)    CAPILLARY BLOOD GLUCOSE      POCT Blood Glucose.: 167 mg/dL (2020 16:31)  POCT Blood Glucose.: 121 mg/dL (2020 11:03)  POCT Blood Glucose.: 109 mg/dL (2020 06:49)  POCT Blood Glucose.: 114 mg/dL (2020 00:43)            RECENT CULTURES:  Urine culture:  06-10 @ 08:52 --   <10,000 CFU/mL Normal Urogenital Flavia      RADIOLOGY & ADDITIONAL TESTS:  < from: CT Abdomen and Pelvis w/ IV Cont (06.10.20 @ 05:45) >      TECHNIQUE: CT examination of the abdomen and pelvis was performed following the intravenous administration of 90 mL Optiray 350. 35 mL Optiray 350 discarded.  CT dose lowering techniques were used, to include: automated exposure control, adjustment for patient size, and/or use of iterative reconstruction.    FINDINGS:    ABDOMEN/PELVIS:    Lower Chest: Clear    Liver: Severe hepatomegaly. Areas of nodular hypoenhancement. Nodularity ofthe hepatic capsule.    Gallbladder/Billary: Decompressed    Pancreas: Within normal limits    Spleen: Mildly enlarged    Adrenal Glands: Within normal limits    Kidneys: 2.9 cm complex cystic mass arising from the upper pole right kidney, with areasof peripheral calcification and central density measurement higher than simple fluid.    GI Tract: Diverticulosis coli. Normal appendix. No bowel obstruction.    Mesentery/Peritoneum: Mild perihepatic ascites. No free air.    Vasculature: Thrombosis of the right portal vein appears acute. Surgical clips in the adjacent rahul hepatis. Main portal vein is enlarged. Superior mesenteric vein appears patent. Atherosclerotic change of the abdominal aorta without aneurysm.    Lymph Nodes: No enlarged lymph node measuring greater than 10 mm in short axis.    Abdominal Wall: Intact    Bladder: Within normal limits    Reproductive: Prostate is normal in caliber.    Musculoskeletal: No acute osseous abnormality.      IMPRESSION:    1.  Acute thrombosis of the right portal vein. Critical results discussed with Dr. Bolden at 6 AM.   2.  Hepatic cirrhosis and stigmata portal venous hypertension.  3.  Indeterminate cystic mass arising from the upper pole right kidney, nonemergent dedicated sonographic evaluation is recommended.    < end of copied text >    < from: US Abdomen Complete (06.10.20 @ 04:44) >      FINDINGS:     LIVER: Mild hepatomegaly. Nonspecific hepatic heterogeneity in echogenicity and echotexture, limiting evaluation for focal lesions. No intrahepatic biliary ductal dilatation.   GALLBLADDER:Contracted. No gallstones, wall thickening, or pericholecystic fluid. No sonographic Maxwell's sign.   CBD: Normal caliber, 6 mm.   PANCREAS: Evaluation of the pancreas is limited secondary to overlying bowel gas.   RIGHT KIDNEY: 10.1 cm in length. Nohydronephrosis.   LEFT KIDNEY: 11.4 cm in length. No hydronephrosis.   SPLEEN: Mild splenomegaly, measuring 16.3 cm without evidence of focal mass.   AORTA AND IVC: Visualized portions of the IVC and aorta are unremarkable.  MISCELLANEOUS: Trace ascites.     IMPRESSION:  Mild hepatosplenomegaly. Nonspecific hepatic heterogeneity in echogenicity and echotexture, limiting evaluation for focal lesions. Trace ascites.     < end of copied text >    < from: MR Abdomen w/wo IV Cont (20 @ 15:47) >  COMPARISON: CT dated 6/10/2020    PROCEDURE:   MRI of the abdomen was performed with and without intravenous contrast.  IV Contrast: Eovist. 10 cc administered, 0 cc discarded.  MRCP was performed.    FINDINGS:  LOWER CHEST: Within normal limits.    LIVER: Nodular contour and capsular retraction. Numerous heterogeneously enhancing lesions, predominantly within the enlarged right hepatic lobe. Intrahepatic portal vein thrombosis, predominantly right.  BILE DUCTS: Normal caliber. No choledocholithiasis.  GALLBLADDER: Within normal limits.  SPLEEN: Enlarged.  PANCREAS: Within normal limits.  ADRENALS: Within normal limits.  KIDNEYS/URETERS: Renal cysts measuring up to 3 cm. No hydronephrosis.    VISUALIZED PORTIONS:  BOWEL: Within normal limits.   PERITONEUM: No ascites.  VESSELS: Atherosclerotic changes.  RETROPERITONEUM/LYMPH NODES: No lymphadenopathy.    ABDOMINAL WALL: Within normal limits.  BONES: Degenerative changes.    IMPRESSION:   Numerous predominantly right hepatic lesions and portal vein thrombosis, suspicious for malignancy.  No cholelithiasis, choledocholithiasis, Edy ductal dilatation.    < end of copied text >          Imaging Personally Reviewed:  [ x] YES  [ ] NO    Consultant(s) Notes Reviewed:  [x ] YES  [ ] NO    Alpha Fetoprotein - Tumor Marker (20 @ 13:57)    Alpha Fetoprotein - Tumor Marker: >86799.0: Test Repeated  Method: Roche Electrochemiluminescence Immuno Assay  Values obtained with different assay methods or kits cannot be  used interchangeably.  Results cannot be interpreted as absolute evidence of the presence  or absence of malignant disease.  AFP values are not interpretable in pregnant females. ng/mL    Care Discussed with Consultants/Other Providers [ x] YES  [ ] NO    Vitamin D, 25-Hydroxy: 11.2: VITD Interpretive Data Result:  30.0-80.0 ng/mL Optimal levels (Reference Range)  >80.0 ng/mL Toxicity possible  20.0-29.0 ng/mL Insufficiency  10.0-19.0 ng/mL Mild to Moderate Deficiency  <10.0 ng/mL Severe Deficiency  Optimal levels for 25-Hydroxy vitamin D are 30.0 ng/mL and above based  upon the Endocrine Society guidelines 2011.  However, there is a lack of  consensus on this and the [place] of [employer] recommends 20.0 ng/mL and  above as optimal levels.  Vitamin D results may vary depending on the  method of analysis.  The Roche alexander e801 electrochemiluminescent  immunoassay method measures both D2 and D3. ng/mL (20 @ 13:57)    Care discussed in detail with patient.  All questions and concerns addressed

## 2020-06-11 NOTE — PROGRESS NOTE ADULT - ASSESSMENT
HPI:  Patient is a 64M with a PMH of HTN, DM, and Liver Ca? who presents to the ED for abdominal pain.  Patient reports that he has had sporadic episodes of lower R sided abdominal pain for the past week.  8/10 in severity with some radiation to the back.  Reports the pain is worse when he takes deep breaths.  Patient endores associated reduced PO intake due to pain and denies hx of constipation, diarrhea, fever, chills, nausea or vomiting.  Patient states that his urine has appeared more yellow than normal and he has noticed this discoloration for the past 2-3 weeks.  Patient states that he was diagnosed with liver cancer about 2 years ago and had started treatment (possibly chemo?) at [facility] however he was not able to complete his treatment as he had to move from [place] to [place] this year.  Denies history of hepatitis.  Unsure about medical history in general.  Has been following with a hepatologist - scheduled for MRI abdomen today.  Mild tachycardia in ED.  Labs show decreased albumin, increased alk phos, bilirubin and transaminitis.  US abdomen benign.  CT abdomen shows portal vein thrombosis - started on heparin drip.  Will admit to floor.        Eddi Calles - hepatologist (10 Guru 2020 06:58)  ------------------------------------------- As Above -----------------------------------  The patient presents with 7 days of vague abdominal pain. Not associated with N/V. Slowly worsening. Denies trauma. The patient denies melena, hematochezia, hematemesis, constipation, diarrhea, or change in bowel movements   Patient states that he has a history of liver cancer 2 years ago status post chemotherapy ( directly applied to liver )   Denies ETOH abuse and stopped drinking a while ago. CT scan reveals cirrhosis and PVT  Patient denies a history of Cirrhosis  Last colonoscopy was many years ago.   Patient being followed by Dr Calles ( Hepatology [phone] ) ( Was seen once )   CT scan c/w Cirrhosis and PVT - was started on heparin in the ER     Cirrhosis, PVT ( new ? ), patient was to have a MRI outpatient today. 3.3 / 318 / 116 / 236 --> 4.0 / 389 / 120 / 220  - 1) Left message with Dr Calles's office  ( twice )  2) Agree with anticoagulation 3) Oncology consult appreciated

## 2020-06-12 ENCOUNTER — TRANSCRIPTION ENCOUNTER (OUTPATIENT)
Age: 65
End: 2020-06-12

## 2020-06-12 VITALS
DIASTOLIC BLOOD PRESSURE: 63 MMHG | RESPIRATION RATE: 17 BRPM | TEMPERATURE: 98 F | OXYGEN SATURATION: 97 % | HEART RATE: 89 BPM | SYSTOLIC BLOOD PRESSURE: 111 MMHG

## 2020-06-12 PROBLEM — I10 ESSENTIAL (PRIMARY) HYPERTENSION: Chronic | Status: ACTIVE | Noted: 2020-06-10

## 2020-06-12 LAB
AFP-TM SERPL-MCNC: HIGH NG/ML
ALBUMIN SERPL ELPH-MCNC: 2.5 G/DL — LOW (ref 3.3–5)
ALP SERPL-CCNC: 231 U/L — HIGH (ref 40–120)
ALT FLD-CCNC: 126 U/L — HIGH (ref 12–78)
ANION GAP SERPL CALC-SCNC: 11 MMOL/L — SIGNIFICANT CHANGE UP (ref 5–17)
AST SERPL-CCNC: 418 U/L — HIGH (ref 15–37)
BILIRUB DIRECT SERPL-MCNC: 2.86 MG/DL — HIGH (ref 0.05–0.2)
BILIRUB INDIRECT FLD-MCNC: 1.1 MG/DL — HIGH (ref 0.2–1)
BILIRUB SERPL-MCNC: 4 MG/DL — HIGH (ref 0.2–1.2)
BUN SERPL-MCNC: 15 MG/DL — SIGNIFICANT CHANGE UP (ref 7–23)
CALCIUM SERPL-MCNC: 9 MG/DL — SIGNIFICANT CHANGE UP (ref 8.5–10.1)
CHLORIDE SERPL-SCNC: 98 MMOL/L — SIGNIFICANT CHANGE UP (ref 96–108)
CO2 SERPL-SCNC: 24 MMOL/L — SIGNIFICANT CHANGE UP (ref 22–31)
CREAT SERPL-MCNC: 0.87 MG/DL — SIGNIFICANT CHANGE UP (ref 0.5–1.3)
GLUCOSE BLDC GLUCOMTR-MCNC: 122 MG/DL — HIGH (ref 70–99)
GLUCOSE SERPL-MCNC: 139 MG/DL — HIGH (ref 70–99)
HCT VFR BLD CALC: 41.7 % — SIGNIFICANT CHANGE UP (ref 39–50)
HGB BLD-MCNC: 13.1 G/DL — SIGNIFICANT CHANGE UP (ref 13–17)
MAGNESIUM SERPL-MCNC: 2.4 MG/DL — SIGNIFICANT CHANGE UP (ref 1.6–2.6)
MCHC RBC-ENTMCNC: 22 PG — LOW (ref 27–34)
MCHC RBC-ENTMCNC: 31.4 GM/DL — LOW (ref 32–36)
MCV RBC AUTO: 70.1 FL — LOW (ref 80–100)
NRBC # BLD: 0 /100 WBCS — SIGNIFICANT CHANGE UP (ref 0–0)
PHOSPHATE SERPL-MCNC: 2.2 MG/DL — LOW (ref 2.5–4.5)
PLATELET # BLD AUTO: 335 K/UL — SIGNIFICANT CHANGE UP (ref 150–400)
POTASSIUM SERPL-MCNC: 4.2 MMOL/L — SIGNIFICANT CHANGE UP (ref 3.5–5.3)
POTASSIUM SERPL-SCNC: 4.2 MMOL/L — SIGNIFICANT CHANGE UP (ref 3.5–5.3)
PROT SERPL-MCNC: 8.5 GM/DL — HIGH (ref 6–8.3)
RBC # BLD: 5.95 M/UL — HIGH (ref 4.2–5.8)
RBC # FLD: 20.5 % — HIGH (ref 10.3–14.5)
SODIUM SERPL-SCNC: 133 MMOL/L — LOW (ref 135–145)
WBC # BLD: 5.76 K/UL — SIGNIFICANT CHANGE UP (ref 3.8–10.5)
WBC # FLD AUTO: 5.76 K/UL — SIGNIFICANT CHANGE UP (ref 3.8–10.5)

## 2020-06-12 PROCEDURE — 99239 HOSP IP/OBS DSCHRG MGMT >30: CPT

## 2020-06-12 RX ORDER — APIXABAN 2.5 MG/1
2 TABLET, FILM COATED ORAL
Qty: 56 | Refills: 0
Start: 2020-06-12 | End: 2020-06-25

## 2020-06-12 RX ORDER — LISINOPRIL 2.5 MG/1
1 TABLET ORAL
Qty: 30 | Refills: 0
Start: 2020-06-12 | End: 2020-07-11

## 2020-06-12 RX ORDER — APIXABAN 2.5 MG/1
10 TABLET, FILM COATED ORAL EVERY 12 HOURS
Refills: 0 | Status: DISCONTINUED | OUTPATIENT
Start: 2020-06-12 | End: 2020-06-12

## 2020-06-12 RX ORDER — APIXABAN 2.5 MG/1
1 TABLET, FILM COATED ORAL
Qty: 60 | Refills: 0
Start: 2020-06-12 | End: 2020-07-11

## 2020-06-12 RX ORDER — ERGOCALCIFEROL 1.25 MG/1
1 CAPSULE ORAL
Qty: 4 | Refills: 0
Start: 2020-06-12 | End: 2020-06-15

## 2020-06-12 RX ADMIN — LISINOPRIL 10 MILLIGRAM(S): 2.5 TABLET ORAL at 06:06

## 2020-06-12 RX ADMIN — ERGOCALCIFEROL 50000 UNIT(S): 1.25 CAPSULE ORAL at 11:23

## 2020-06-12 RX ADMIN — APIXABAN 10 MILLIGRAM(S): 2.5 TABLET, FILM COATED ORAL at 11:23

## 2020-06-12 RX ADMIN — Medication 180 MILLIGRAM(S): at 06:06

## 2020-06-12 RX ADMIN — Medication 1: at 11:23

## 2020-06-12 NOTE — DISCHARGE NOTE PROVIDER - NSDCCPCAREPLAN_GEN_ALL_CORE_FT
PRINCIPAL DISCHARGE DIAGNOSIS  Diagnosis: Portal vein thrombosis  Assessment and Plan of Treatment: continue with eliquis follow up with your doctors

## 2020-06-12 NOTE — DISCHARGE NOTE PROVIDER - NSDCMRMEDTOKEN_GEN_ALL_CORE_FT
apixaban 5 mg oral tablet: 2 tab(s) orally every 12 hours  Eliquis 5 mg oral tablet: 1 tab(s) orally 2 times a day   ergocalciferol 50,000 intl units (1.25 mg) oral capsule: 1 cap(s) orally once a week   lisinopril 10 mg oral tablet: 1 tab(s) orally once a day

## 2020-06-12 NOTE — DISCHARGE NOTE PROVIDER - HOSPITAL COURSE
64M with a PMH of HTN, DM, and Liver Ca? who presents to the ED for abdominal pain.  Patient reports that he has had sporadic episodes of lower R sided abdominal pain for the past week.              Assessment and Plan:         #Acute Rt Portal vein thrombosis, switched to ther lovenox bid, will d/c with DOAC     Liver cirrhosis with liver cancer--was getting [id number] at [place] s/p one local treatment    follows with hepatologist outpatient     AFP Alpha Fetoprotein - Tumor Marker: >90226.0    MRI abd/pel noted : < from: MR Abdomen w/wo IV Cont (06.11.20 @ 15:47) >    Nodular contour and capsular retraction. Numerous heterogeneously enhancing lesions, predominantly within the enlarged right hepatic lobe. Intrahepatic portal vein thrombosis, predominantly right.    heme/onc and GI consult appreciated .     Discussed with ID, no c/i to d/c with outpt follow-up with his hepatologist and oncologist     Patient being followed by Dr Calles ( Hepatology [phone] )        To follow up w his pcp c/w eliquis             #Transaminitis and elevated hyperbilirubinemia (mainly direct), most likely sec to liver cancer and portal vein thrombosis             #HTN--changed amlodipine to cardizem, continue with ACEI,             #DM2    A1C 8.9%    FS goal 140-180    CHO diet         #Rt Renal cyst    Renal US: There is a 2.5 cm cyst in the upper pole Rt Kidney     follow-up outpt         #Vitamin D deficiency --vit D level 11    supplement vit D 50,000iu weekly  x 8 weeks

## 2020-06-12 NOTE — PROGRESS NOTE ADULT - REASON FOR ADMISSION
portal vein thrombosis

## 2020-06-12 NOTE — PROGRESS NOTE ADULT - PROBLEM SELECTOR PLAN 1
- MRI shows multiple liver lesions s/p [id number]  - Anticoagulation  - AFP very elevated  - watch bilirubin

## 2020-06-12 NOTE — PROGRESS NOTE ADULT - ASSESSMENT
HPI:  Patient is a 64M with a PMH of HTN, DM, and Liver Ca? who presents to the ED for abdominal pain.  Patient reports that he has had sporadic episodes of lower R sided abdominal pain for the past week.  8/10 in severity with some radiation to the back.  Reports the pain is worse when he takes deep breaths.  Patient endores associated reduced PO intake due to pain and denies hx of constipation, diarrhea, fever, chills, nausea or vomiting.  Patient states that his urine has appeared more yellow than normal and he has noticed this discoloration for the past 2-3 weeks.  Patient states that he was diagnosed with liver cancer about 2 years ago and had started treatment (possibly chemo?) at [facility] however he was not able to complete his treatment as he had to move from [place] to [place] this year.  Denies history of hepatitis.  Unsure about medical history in general.  Has been following with a hepatologist - scheduled for MRI abdomen today.  Mild tachycardia in ED.  Labs show decreased albumin, increased alk phos, bilirubin and transaminitis.  US abdomen benign.  CT abdomen shows portal vein thrombosis - started on heparin drip.  Will admit to floor.        Eddi Calles - hepatologist (10 Guru 2020 06:58)  ------------------------------------------- As Above -----------------------------------  The patient presents with 7 days of vague abdominal pain. Not associated with N/V. Slowly worsening. Denies trauma. The patient denies melena, hematochezia, hematemesis, constipation, diarrhea, or change in bowel movements   Patient states that he has a history of liver cancer 2 years ago status post chemotherapy ( directly applied to liver )   Denies ETOH abuse and stopped drinking a while ago. CT scan reveals cirrhosis and PVT  Patient denies a history of Cirrhosis  Last colonoscopy was many years ago.   Patient being followed by Dr Calles ( Hepatology [phone] ) ( Was seen once )   CT scan c/w Cirrhosis and PVT - was started on heparin in the ER     Cirrhosis, PVT ( new ? ), patient was to have a MRI outpatient today. 3.3 / 318 / 116 / 236 --> 4.0 / 389 / 120 / 220  - 1) Left message with Dr Calles's office  ( twice )  2) Agree with anticoagulation 3) Oncology consult appreciated    [id number]  ---------  Spoke with PA of Dr Calles. ( Patient seen once in February ) Anticoagulation seems appropriate for PVT. See MRI result. C/W PVT and HCC. 4.0 / 418 / 126 / 231  AFP 60,500.  RUQ pain c/w radiculopathy and / or pain from liver cancer. Stable from GI point of view. Patient knows to make appointment with hepatology ASAP.

## 2020-06-12 NOTE — DISCHARGE NOTE NURSING/CASE MANAGEMENT/SOCIAL WORK - PATIENT PORTAL LINK FT
You can access the FollowMyHealth Patient Portal offered by [facility] by registering at the following website: [email or web address]. By joining [facility]’s FollowMyHealth portal, you will also be able to view your health information using other applications (apps) compatible with our system.

## 2020-06-12 NOTE — PROGRESS NOTE ADULT - SUBJECTIVE AND OBJECTIVE BOX
lying in bed    Vital Signs Last 24 Hrs  T(C): 36.6 (12 Jun 2020 11:20), Max: 37.2 (12 Jun 2020 05:52)  T(F): 97.8 (12 Jun 2020 11:20), Max: 98.9 (12 Jun 2020 05:52)  HR: 89 (12 Jun 2020 11:20) (86 - 95)  BP: 111/63 (12 Jun 2020 11:20) (111/63 - 170/88)  BP(mean): --  RR: 17 (12 Jun 2020 11:20) (17 - 18)  SpO2: 97% (12 Jun 2020 11:20) (95% - 97%)    PHYSICAL EXAM:    general - AAO x 3  HEENT - No Icterus  CVS - RRR  RS - AE B/L  Abd - soft, NT  Ext - Pulses +        LABS:                        13.1   5.76  )-----------( 335      ( 12 Jun 2020 06:14 )             41.7     06-12    133<L>  |  98  |  15  ----------------------------<  139<H>  4.2   |  24  |  0.87    Ca    9.0      12 Jun 2020 06:14  Phos  2.2     06-12  Mg     2.4     06-12    TPro  8.5<H>  /  Alb  2.5<L>  /  TBili  4.0<H>  /  DBili  2.86<H>  /  AST  418<H>  /  ALT  126<H>  /  AlkPhos  231<H>  06-12    PTT - ( 11 Jun 2020 06:35 )  PTT:67.8 sec      Culture - Urine (collected 10 Guru 2020 08:52)  Source: .Urine Clean Catch (Midstream)  Final Report (11 Jun 2020 08:18):    <10,000 CFU/mL Normal Urogenital Flavia        RADIOLOGY & ADDITIONAL STUDIES:

## 2020-06-12 NOTE — PROGRESS NOTE ADULT - SUBJECTIVE AND OBJECTIVE BOX
Patient is a 64y old  Male who presents with a chief complaint of portal vein thrombosis (12 Jun 2020 11:41)      HPI:  Patient is a 64M with a PMH of HTN, DM, and Liver Ca? who presents to the ED for abdominal pain.  Patient reports that he has had sporadic episodes of lower R sided abdominal pain for the past week.  8/10 in severity with some radiation to the back.  Reports the pain is worse when he takes deep breaths.  Patient endores associated reduced PO intake due to pain and denies hx of constipation, diarrhea, fever, chills, nausea or vomiting.  Patient states that his urine has appeared more yellow than normal and he has noticed this discoloration for the past 2-3 weeks.  Patient states that he was diagnosed with liver cancer about 2 years ago and had started treatment (possibly chemo?) at [facility] however he was not able to complete his treatment as he had to move from [place] to [place] this year.  Denies history of hepatitis.  Unsure about medical history in general.  Has been following with a hepatologist - scheduled for MRI abdomen today.  Mild tachycardia in ED.  Labs show decreased albumin, increased alk phos, bilirubin and transaminitis.  US abdomen benign.  CT abdomen shows portal vein thrombosis - started on heparin drip.  Will admit to floor.        Eddi Calles - hepatologist (10 Guru 2020 06:58)      INTERVAL HPI/OVERNIGHT EVENTS:  Patient still complains of RUQ. Patient states that pain starts in the posterior axillary line and radiates around to the RUQ at the level of the mid clavicle. Unchanged . Minimal whille lying down. Worse with change of movement. The patient denies melena, hematochezia, hematemesis, nausea, vomiting,  constipation, diarrhea, or change in bowel movements     MEDICATIONS  (STANDING):  apixaban 10 milliGRAM(s) Oral every 12 hours  dextrose 5%. 1000 milliLiter(s) (50 mL/Hr) IV Continuous <Continuous>  dextrose 50% Injectable 12.5 Gram(s) IV Push once  dextrose 50% Injectable 25 Gram(s) IV Push once  dextrose 50% Injectable 25 Gram(s) IV Push once  diltiazem    milliGRAM(s) Oral daily  ergocalciferol 27658 Unit(s) Oral every week  insulin lispro (HumaLOG) corrective regimen sliding scale   SubCutaneous three times a day before meals  lisinopril 10 milliGRAM(s) Oral daily    MEDICATIONS  (PRN):  dextrose 40% Gel 15 Gram(s) Oral once PRN Blood Glucose LESS THAN 70 milliGRAM(s)/deciliter  glucagon  Injectable 1 milliGRAM(s) IntraMuscular once PRN Glucose LESS THAN 70 milligrams/deciliter  hydrALAZINE Injectable 5 milliGRAM(s) IV Push every 6 hours PRN if SBP >160      FAMILY HISTORY:      Allergies    No Known Allergies    Intolerances        PMH/PSH:  Hypertension  No significant past surgical history        REVIEW OF SYSTEMS:  CONSTITUTIONAL: No fever, weight loss, or fatigue  EYES: No eye pain, visual disturbances, or discharge  ENMT:  No difficulty hearing, tinnitus, vertigo; No sinus or throat pain  NECK: No pain or stiffness  BREASTS: No pain, masses, or nipple discharge  RESPIRATORY: No cough, wheezing, chills or hemoptysis; No shortness of breath  CARDIOVASCULAR: No chest pain, palpitations, dizziness, or leg swelling  GASTROINTESTINAL: See above  GENITOURINARY: No dysuria, frequency, hematuria, or incontinence  NEUROLOGICAL: No headaches, memory loss, loss of strength, numbness, or tremors  SKIN: No itching, burning, rashes, or lesions   LYMPH NODES: No enlarged glands  ENDOCRINE: No heat or cold intolerance; No hair loss  MUSCULOSKELETAL: No joint pain or swelling; No muscle, back, or extremity pain  PSYCHIATRIC: No depression, anxiety, mood swings, or difficulty sleeping  HEME/LYMPH: No easy bruising, or bleeding gums  ALLERGY AND IMMUNOLOGIC: No hives or eczema    Vital Signs Last 24 Hrs  T(C): 36.6 (12 Jun 2020 11:20), Max: 37.2 (12 Jun 2020 05:52)  T(F): 97.8 (12 Jun 2020 11:20), Max: 98.9 (12 Jun 2020 05:52)  HR: 89 (12 Jun 2020 11:20) (86 - 95)  BP: 111/63 (12 Jun 2020 11:20) (111/63 - 170/88)  BP(mean): --  RR: 17 (12 Jun 2020 11:20) (17 - 18)  SpO2: 97% (12 Jun 2020 11:20) (95% - 97%)    PHYSICAL EXAM:  GENERAL: NAD, well-groomed, well-developed  HEAD:  Atraumatic, Normocephalic  EYES: EOMI, PERRLA, conjunctiva and sclera clear  NECK: Supple, No JVD, Normal thyroid  NERVOUS SYSTEM:  Alert & Oriented X3, Good concentration;   CHEST/LUNG: Clear to percussion bilaterally; No rales, rhonchi, wheezing, or rubs  HEART: Regular rate and rhythm; No murmurs, rubs, or gallops  ABDOMEN: Soft, Nontender, Nondistended; Bowel sounds present  EXTREMITIES:  2+ Peripheral Pulses, No clubbing, cyanosis, or edema  LYMPH: No lymphadenopathy noted  SKIN: No rashes or lesions    LAB  06-10 @ 03:12  amylase 82   lipase 344                           13.1   5.76  )-----------( 335      ( 12 Jun 2020 06:14 )             41.7       CBC:  06-12 @ 06:14  WBC 5.76   Hgb 13.1   Hct 41.7   Plts 335  MCV 70.1  06-11 @ 17:22  WBC 6.71   Hgb 13.4   Hct 42.2   Plts 357  MCV 70.8  06-11 @ 06:35  WBC 5.90   Hgb 12.9   Hct 41.0   Plts 311  MCV 70.9  06-10 @ 14:41  WBC 6.97   Hgb 12.6   Hct 40.5   Plts 324  MCV 71.8  06-10 @ 03:12  WBC 7.53   Hgb 13.1   Hct 41.4   Plts 324  MCV 70.4      Chemistry:  06-12 @ 06:14  Na+ 133  K+ 4.2  Cl- 98  CO2 24  BUN 15  Cr 0.87     06-11 @ 06:35  Na+ 136  K+ 4.3  Cl- 102  CO2 22  BUN 15  Cr 0.90     06-10 @ 03:12  Na+ 134  K+ 4.3  Cl- 98  CO2 24  BUN 17  Cr 1.09         Glucose, Serum: 139 mg/dL (06-12 @ 06:14)  Glucose, Serum: 121 mg/dL (06-11 @ 06:35)  Glucose, Serum: 156 mg/dL (06-10 @ 03:12)      12 Jun 2020 06:14    133    |  98     |  15     ----------------------------<  139    4.2     |  24     |  0.87   11 Jun 2020 06:35    136    |  102    |  15     ----------------------------<  121    4.3     |  22     |  0.90   10 Guru 2020 03:12    134    |  98     |  17     ----------------------------<  156    4.3     |  24     |  1.09     Ca    9.0        12 Jun 2020 06:14  Ca    9.0        11 Jun 2020 06:35  Ca    9.5        10 Guru 2020 03:12  Phos  2.2       12 Jun 2020 06:14  Phos  3.3       11 Jun 2020 06:35  Mg     2.4       12 Jun 2020 06:14  Mg     2.5       11 Jun 2020 06:35    TPro  8.5    /  Alb  2.5    /  TBili  4.0    /  DBili  2.86   /  AST  418    /  ALT  126    /  AlkPhos  231    12 Jun 2020 06:14  TPro  8.5    /  Alb  2.5    /  TBili  4.0    /  DBili  2.74   /  AST  389    /  ALT  120    /  AlkPhos  220    11 Jun 2020 06:35  TPro  8.7    /  Alb  2.8    /  TBili  3.3    /  DBili  2.37   /  AST  318    /  ALT  116    /  AlkPhos  236    10 Guru 2020 03:12      PTT - ( 11 Jun 2020 06:35 )  PTT:67.8 sec        CAPILLARY BLOOD GLUCOSE      POCT Blood Glucose.: 173 mg/dL (12 Jun 2020 11:06)  POCT Blood Glucose.: 122 mg/dL (12 Jun 2020 08:02)  POCT Blood Glucose.: 156 mg/dL (11 Jun 2020 21:51)  POCT Blood Glucose.: 167 mg/dL (11 Jun 2020 16:31)          RADIOLOGY & ADDITIONAL TESTS:  < from: MR Abdomen w/wo IV Cont (06.11.20 @ 15:47) >    EXAM:  MR ABDOMEN WAW IC                            PROCEDURE DATE:  06/11/2020          INTERPRETATION:  CLINICAL INFORMATION: Abdominal pain    COMPARISON: CT dated 6/10/2020    PROCEDURE:   MRI of the abdomen was performed with and without intravenous contrast.  IV Contrast: Eovist. 10 cc administered, 0 cc discarded.  MRCP was performed.    FINDINGS:  LOWER CHEST: Within normal limits.    LIVER: Nodular contour and capsular retraction. Numerous heterogeneously enhancing lesions, predominantly within the enlarged right hepatic lobe. Intrahepatic portal vein thrombosis, predominantly right.  BILE DUCTS: Normal caliber. No choledocholithiasis.  GALLBLADDER: Within normal limits.  SPLEEN: Enlarged.  PANCREAS: Within normal limits.  ADRENALS: Within normal limits.  KIDNEYS/URETERS: Renal cysts measuring up to 3 cm. No hydronephrosis.    VISUALIZED PORTIONS:  BOWEL: Within normal limits.   PERITONEUM: No ascites.  VESSELS: Atherosclerotic changes.  RETROPERITONEUM/LYMPH NODES: No lymphadenopathy.    ABDOMINAL WALL: Within normal limits.  BONES: Degenerative changes.    IMPRESSION:   Numerous predominantly right hepatic lesions and portal vein thrombosis, suspicious for malignancy.  No cholelithiasis, choledocholithiasis, Edy ductal dilatation.                NEGRA PAUL M.D., ATTENDING RADIOLOGIST  This document has been electronically signed. Jun 11 2020  4:22PM        < end of copied text >      Imaging Personally Reviewed:  [ ] YES  [ ] NO    Consultant(s) Notes Reviewed:  [ ] YES  [ ] NO    Care Discussed with Consultants/Other Providers [ ] YES  [ ] NO

## 2020-06-15 ENCOUNTER — LABORATORY RESULT (OUTPATIENT)
Age: 65
End: 2020-06-15

## 2020-06-15 ENCOUNTER — APPOINTMENT (OUTPATIENT)
Dept: HEPATOLOGY | Facility: CLINIC | Age: 65
End: 2020-06-15
Payer: MEDICAID

## 2020-06-15 VITALS
HEART RATE: 104 BPM | HEIGHT: 67 IN | RESPIRATION RATE: 16 BRPM | TEMPERATURE: 98.8 F | SYSTOLIC BLOOD PRESSURE: 171 MMHG | WEIGHT: 209 LBS | BODY MASS INDEX: 32.8 KG/M2 | OXYGEN SATURATION: 97 % | DIASTOLIC BLOOD PRESSURE: 92 MMHG

## 2020-06-15 DIAGNOSIS — E55.9 VITAMIN D DEFICIENCY, UNSPECIFIED: ICD-10-CM

## 2020-06-15 DIAGNOSIS — E80.7 DISORDER OF BILIRUBIN METABOLISM, UNSPECIFIED: ICD-10-CM

## 2020-06-15 DIAGNOSIS — C22.0 LIVER CELL CARCINOMA: ICD-10-CM

## 2020-06-15 DIAGNOSIS — K76.6 PORTAL HYPERTENSION: ICD-10-CM

## 2020-06-15 DIAGNOSIS — N28.1 CYST OF KIDNEY, ACQUIRED: ICD-10-CM

## 2020-06-15 DIAGNOSIS — I10 ESSENTIAL (PRIMARY) HYPERTENSION: ICD-10-CM

## 2020-06-15 DIAGNOSIS — R10.31 RIGHT LOWER QUADRANT PAIN: ICD-10-CM

## 2020-06-15 DIAGNOSIS — I81 PORTAL VEIN THROMBOSIS: ICD-10-CM

## 2020-06-15 DIAGNOSIS — R00.0 TACHYCARDIA, UNSPECIFIED: ICD-10-CM

## 2020-06-15 DIAGNOSIS — K76.0 FATTY (CHANGE OF) LIVER, NOT ELSEWHERE CLASSIFIED: ICD-10-CM

## 2020-06-15 DIAGNOSIS — Z92.21 PERSONAL HISTORY OF ANTINEOPLASTIC CHEMOTHERAPY: ICD-10-CM

## 2020-06-15 DIAGNOSIS — K74.60 UNSPECIFIED CIRRHOSIS OF LIVER: ICD-10-CM

## 2020-06-15 DIAGNOSIS — C22.9 MALIGNANT NEOPLASM OF LIVER, NOT SPECIFIED AS PRIMARY OR SECONDARY: ICD-10-CM

## 2020-06-15 DIAGNOSIS — E11.9 TYPE 2 DIABETES MELLITUS WITHOUT COMPLICATIONS: ICD-10-CM

## 2020-06-15 DIAGNOSIS — Z79.899 OTHER LONG TERM (CURRENT) DRUG THERAPY: ICD-10-CM

## 2020-06-15 PROCEDURE — 99214 OFFICE O/P EST MOD 30 MIN: CPT

## 2020-06-16 DIAGNOSIS — E87.1 HYPO-OSMOLALITY AND HYPONATREMIA: ICD-10-CM

## 2020-06-16 LAB
AFP-TM SERPL-MCNC: ABNORMAL NG/ML
ALBUMIN SERPL ELPH-MCNC: 3.7 G/DL
ALP BLD-CCNC: 273 U/L
ALT SERPL-CCNC: 159 U/L
ANION GAP SERPL CALC-SCNC: 21 MMOL/L
AST SERPL-CCNC: 471 U/L
BASOPHILS # BLD AUTO: 0 K/UL
BASOPHILS NFR BLD AUTO: 0 %
BILIRUB SERPL-MCNC: 6.7 MG/DL
BUN SERPL-MCNC: 15 MG/DL
CALCIUM SERPL-MCNC: 10 MG/DL
CHLORIDE SERPL-SCNC: 89 MMOL/L
CO2 SERPL-SCNC: 19 MMOL/L
CREAT SERPL-MCNC: 0.88 MG/DL
EOSINOPHIL # BLD AUTO: 0.11 K/UL
EOSINOPHIL NFR BLD AUTO: 1.8 %
GLUCOSE SERPL-MCNC: 179 MG/DL
HCT VFR BLD CALC: 43.6 %
HGB BLD-MCNC: 13.6 G/DL
LYMPHOCYTES # BLD AUTO: 0.66 K/UL
LYMPHOCYTES NFR BLD AUTO: 10.5 %
MAN DIFF?: NORMAL
MCHC RBC-ENTMCNC: 21.7 PG
MCHC RBC-ENTMCNC: 31.2 GM/DL
MCV RBC AUTO: 69.5 FL
MONOCYTES # BLD AUTO: 0.44 K/UL
MONOCYTES NFR BLD AUTO: 7 %
NEUTROPHILS # BLD AUTO: 4.83 K/UL
NEUTROPHILS NFR BLD AUTO: 76.3 %
PLATELET # BLD AUTO: 273 K/UL
POTASSIUM SERPL-SCNC: 5.3 MMOL/L
PROT SERPL-MCNC: 7.8 G/DL
RBC # BLD: 6.27 M/UL
RBC # FLD: 21.9 %
SODIUM SERPL-SCNC: 129 MMOL/L
WBC # FLD AUTO: 6.33 K/UL

## 2020-06-22 LAB
ANION GAP SERPL CALC-SCNC: 20 MMOL/L
BUN SERPL-MCNC: 16 MG/DL
CALCIUM SERPL-MCNC: 9.4 MG/DL
CHLORIDE SERPL-SCNC: 85 MMOL/L
CO2 SERPL-SCNC: 22 MMOL/L
CREAT SERPL-MCNC: 1 MG/DL
GLUCOSE SERPL-MCNC: 117 MG/DL
POTASSIUM SERPL-SCNC: 5.1 MMOL/L
SODIUM SERPL-SCNC: 127 MMOL/L

## 2020-06-22 RX ORDER — APIXABAN 5 MG/1
5 TABLET, FILM COATED ORAL
Refills: 0 | Status: ACTIVE | COMMUNITY

## 2020-06-26 ENCOUNTER — APPOINTMENT (OUTPATIENT)
Dept: HEPATOLOGY | Facility: CLINIC | Age: 65
End: 2020-06-26

## 2020-07-20 ENCOUNTER — APPOINTMENT (OUTPATIENT)
Dept: HEPATOLOGY | Facility: CLINIC | Age: 65
End: 2020-07-20

## 2023-01-03 NOTE — H&P ADULT - PROBLEM/PLAN-3
Initiate Treatment: Targadox 100mg QD when flaring, 50mg QD for maintenance Continue Regimen: Plexion QD\\nAczone 7.5% gel QAM Detail Level: Zone Render In Strict Bullet Format?: No DISPLAY PLAN FREE TEXT